# Patient Record
Sex: MALE | Race: WHITE | NOT HISPANIC OR LATINO | Employment: OTHER | URBAN - METROPOLITAN AREA
[De-identification: names, ages, dates, MRNs, and addresses within clinical notes are randomized per-mention and may not be internally consistent; named-entity substitution may affect disease eponyms.]

---

## 2023-05-02 ENCOUNTER — HOSPITAL ENCOUNTER (INPATIENT)
Facility: HOSPITAL | Age: 48
LOS: 2 days | Discharge: OTHER INSTITUTION | DRG: 291 | End: 2023-05-04
Attending: EMERGENCY MEDICINE | Admitting: STUDENT IN AN ORGANIZED HEALTH CARE EDUCATION/TRAINING PROGRAM
Payer: MEDICAID

## 2023-05-02 DIAGNOSIS — R10.84 GENERALIZED ABDOMINAL PAIN: ICD-10-CM

## 2023-05-02 DIAGNOSIS — I10 HYPERTENSION, UNSPECIFIED TYPE: Primary | ICD-10-CM

## 2023-05-02 DIAGNOSIS — R79.89 ELEVATED SERUM CREATININE: ICD-10-CM

## 2023-05-02 DIAGNOSIS — R06.02 SOB (SHORTNESS OF BREATH): ICD-10-CM

## 2023-05-02 DIAGNOSIS — I42.9 CARDIOMYOPATHY, UNSPECIFIED TYPE: ICD-10-CM

## 2023-05-02 DIAGNOSIS — R79.89 ELEVATED TROPONIN: ICD-10-CM

## 2023-05-02 DIAGNOSIS — I16.0 HYPERTENSIVE URGENCY: ICD-10-CM

## 2023-05-02 PROCEDURE — 82550 ASSAY OF CK (CPK): CPT | Performed by: EMERGENCY MEDICINE

## 2023-05-02 PROCEDURE — 93005 ELECTROCARDIOGRAM TRACING: CPT

## 2023-05-02 PROCEDURE — 96376 TX/PRO/DX INJ SAME DRUG ADON: CPT

## 2023-05-02 PROCEDURE — 84439 ASSAY OF FREE THYROXINE: CPT | Performed by: NURSE PRACTITIONER

## 2023-05-02 PROCEDURE — 83735 ASSAY OF MAGNESIUM: CPT | Performed by: NURSE PRACTITIONER

## 2023-05-02 PROCEDURE — 36415 COLL VENOUS BLD VENIPUNCTURE: CPT | Performed by: EMERGENCY MEDICINE

## 2023-05-02 PROCEDURE — 83690 ASSAY OF LIPASE: CPT | Performed by: EMERGENCY MEDICINE

## 2023-05-02 PROCEDURE — 12000002 HC ACUTE/MED SURGE SEMI-PRIVATE ROOM

## 2023-05-02 PROCEDURE — 85025 COMPLETE CBC W/AUTO DIFF WBC: CPT | Performed by: EMERGENCY MEDICINE

## 2023-05-02 PROCEDURE — 80053 COMPREHEN METABOLIC PANEL: CPT | Performed by: EMERGENCY MEDICINE

## 2023-05-02 PROCEDURE — 93010 EKG 12-LEAD: ICD-10-PCS | Mod: ,,, | Performed by: INTERNAL MEDICINE

## 2023-05-02 PROCEDURE — 93010 ELECTROCARDIOGRAM REPORT: CPT | Mod: ,,, | Performed by: INTERNAL MEDICINE

## 2023-05-02 PROCEDURE — 82077 ASSAY SPEC XCP UR&BREATH IA: CPT | Performed by: EMERGENCY MEDICINE

## 2023-05-02 PROCEDURE — 99285 EMERGENCY DEPT VISIT HI MDM: CPT | Mod: 25

## 2023-05-02 PROCEDURE — 84443 ASSAY THYROID STIM HORMONE: CPT | Performed by: NURSE PRACTITIONER

## 2023-05-02 PROCEDURE — 84484 ASSAY OF TROPONIN QUANT: CPT | Performed by: EMERGENCY MEDICINE

## 2023-05-02 PROCEDURE — 87389 HIV-1 AG W/HIV-1&-2 AB AG IA: CPT | Performed by: EMERGENCY MEDICINE

## 2023-05-02 PROCEDURE — 86803 HEPATITIS C AB TEST: CPT | Performed by: EMERGENCY MEDICINE

## 2023-05-02 PROCEDURE — 83880 ASSAY OF NATRIURETIC PEPTIDE: CPT | Performed by: NURSE PRACTITIONER

## 2023-05-02 PROCEDURE — 96374 THER/PROPH/DIAG INJ IV PUSH: CPT

## 2023-05-02 RX ORDER — MAG HYDROX/ALUMINUM HYD/SIMETH 200-200-20
30 SUSPENSION, ORAL (FINAL DOSE FORM) ORAL ONCE
Status: COMPLETED | OUTPATIENT
Start: 2023-05-03 | End: 2023-05-03

## 2023-05-02 RX ORDER — LIDOCAINE HYDROCHLORIDE 20 MG/ML
15 SOLUTION OROPHARYNGEAL ONCE
Status: COMPLETED | OUTPATIENT
Start: 2023-05-03 | End: 2023-05-03

## 2023-05-03 PROBLEM — E66.9 OBESITY (BMI 30.0-34.9): Status: ACTIVE | Noted: 2023-05-03

## 2023-05-03 PROBLEM — I10 PRIMARY HYPERTENSION: Status: ACTIVE | Noted: 2023-05-03

## 2023-05-03 PROBLEM — R10.9 ABDOMINAL PAIN: Status: ACTIVE | Noted: 2023-05-03

## 2023-05-03 LAB
ALBUMIN SERPL BCP-MCNC: 3.6 G/DL (ref 3.5–5.2)
ALP SERPL-CCNC: 63 U/L (ref 55–135)
ALT SERPL W/O P-5'-P-CCNC: 80 U/L (ref 10–44)
AMPHET+METHAMPHET UR QL: NEGATIVE
ANION GAP SERPL CALC-SCNC: 12 MMOL/L (ref 8–16)
AORTIC ROOT ANNULUS: 3.77 CM
AORTIC VALVE CUSP SEPERATION: 1.51 CM
ASCENDING AORTA: 2.84 CM
AST SERPL-CCNC: 43 U/L (ref 10–40)
AV INDEX (PROSTH): 0.94
AV MEAN GRADIENT: 5 MMHG
AV PEAK GRADIENT: 8 MMHG
AV VALVE AREA: 4.52 CM2
AV VELOCITY RATIO: 0.79
BACTERIA #/AREA URNS HPF: NORMAL /HPF
BARBITURATES UR QL SCN>200 NG/ML: NEGATIVE
BASOPHILS # BLD AUTO: 0.03 K/UL (ref 0–0.2)
BASOPHILS NFR BLD: 0.3 % (ref 0–1.9)
BENZODIAZ UR QL SCN>200 NG/ML: NEGATIVE
BILIRUB SERPL-MCNC: 0.8 MG/DL (ref 0.1–1)
BILIRUB UR QL STRIP: NEGATIVE
BNP SERPL-MCNC: 976 PG/ML (ref 0–99)
BSA FOR ECHO PROCEDURE: 2.36 M2
BUN SERPL-MCNC: 13 MG/DL (ref 6–20)
BZE UR QL SCN: NEGATIVE
CALCIUM SERPL-MCNC: 8.9 MG/DL (ref 8.7–10.5)
CANNABINOIDS UR QL SCN: NEGATIVE
CHLORIDE SERPL-SCNC: 100 MMOL/L (ref 95–110)
CK SERPL-CCNC: 142 U/L (ref 20–200)
CLARITY UR: CLEAR
CO2 SERPL-SCNC: 23 MMOL/L (ref 23–29)
COLOR UR: YELLOW
CREAT SERPL-MCNC: 1.5 MG/DL (ref 0.5–1.4)
CREAT UR-MCNC: 109.6 MG/DL (ref 23–375)
CV ECHO LV RWT: 0.55 CM
DIFFERENTIAL METHOD: ABNORMAL
DOP CALC AO PEAK VEL: 1.37 M/S
DOP CALC AO VTI: 19.5 CM
DOP CALC LVOT AREA: 4.8 CM2
DOP CALC LVOT DIAMETER: 2.47 CM
DOP CALC LVOT PEAK VEL: 1.08 M/S
DOP CALC LVOT STROKE VOLUME: 88.12 CM3
DOP CALC MV VTI: 16.2 CM
DOP CALCLVOT PEAK VEL VTI: 18.4 CM
E WAVE DECELERATION TIME: 204.05 MSEC
E/A RATIO: 0.89
ECHO LV POSTERIOR WALL: 1.42 CM (ref 0.6–1.1)
EJECTION FRACTION: 15 %
EOSINOPHIL # BLD AUTO: 0.1 K/UL (ref 0–0.5)
EOSINOPHIL NFR BLD: 0.6 % (ref 0–8)
ERYTHROCYTE [DISTWIDTH] IN BLOOD BY AUTOMATED COUNT: 15.6 % (ref 11.5–14.5)
EST. GFR  (NO RACE VARIABLE): 57 ML/MIN/1.73 M^2
ETHANOL SERPL-MCNC: <10 MG/DL
FRACTIONAL SHORTENING: 6 % (ref 28–44)
GLUCOSE SERPL-MCNC: 115 MG/DL (ref 70–110)
GLUCOSE UR QL STRIP: NEGATIVE
HCT VFR BLD AUTO: 49.8 % (ref 40–54)
HCV AB SERPL QL IA: NORMAL
HGB BLD-MCNC: 16.9 G/DL (ref 14–18)
HGB UR QL STRIP: ABNORMAL
HIV 1+2 AB+HIV1 P24 AG SERPL QL IA: NORMAL
HYALINE CASTS #/AREA URNS LPF: 0 /LPF
IMM GRANULOCYTES # BLD AUTO: 0.18 K/UL (ref 0–0.04)
IMM GRANULOCYTES NFR BLD AUTO: 1.8 % (ref 0–0.5)
INTERVENTRICULAR SEPTUM: 1.34 CM (ref 0.6–1.1)
IVRT: 87.54 MSEC
KETONES UR QL STRIP: NEGATIVE
LA MAJOR: 5.69 CM
LEFT INTERNAL DIMENSION IN SYSTOLE: 4.79 CM (ref 2.1–4)
LEFT VENTRICLE DIASTOLIC VOLUME INDEX: 59.43 ML/M2
LEFT VENTRICLE DIASTOLIC VOLUME: 135.5 ML
LEFT VENTRICLE MASS INDEX: 130 G/M2
LEFT VENTRICLE SYSTOLIC VOLUME INDEX: 47.9 ML/M2
LEFT VENTRICLE SYSTOLIC VOLUME: 109.13 ML
LEFT VENTRICULAR INTERNAL DIMENSION IN DIASTOLE: 5.12 CM (ref 3.5–6)
LEFT VENTRICULAR MASS: 296.03 G
LEUKOCYTE ESTERASE UR QL STRIP: NEGATIVE
LIPASE SERPL-CCNC: 24 U/L (ref 4–60)
LVOT MG: 2.66 MMHG
LVOT MV: 0.76 CM/S
LYMPHOCYTES # BLD AUTO: 1.5 K/UL (ref 1–4.8)
LYMPHOCYTES NFR BLD: 14.4 % (ref 18–48)
MAGNESIUM SERPL-MCNC: 1.9 MG/DL (ref 1.6–2.6)
MCH RBC QN AUTO: 30.5 PG (ref 27–31)
MCHC RBC AUTO-ENTMCNC: 33.9 G/DL (ref 32–36)
MCV RBC AUTO: 90 FL (ref 82–98)
METHADONE UR QL SCN>300 NG/ML: NEGATIVE
MICROSCOPIC COMMENT: NORMAL
MONOCYTES # BLD AUTO: 0.9 K/UL (ref 0.3–1)
MONOCYTES NFR BLD: 8.4 % (ref 4–15)
MV MEAN GRADIENT: 3 MMHG
MV PEAK A VEL: 1.05 M/S
MV PEAK E VEL: 0.93 M/S
MV PEAK GRADIENT: 8 MMHG
MV STENOSIS PRESSURE HALF TIME: 46.36 MS
MV VALVE AREA BY CONTINUITY EQUATION: 5.44 CM2
MV VALVE AREA P 1/2 METHOD: 4.75 CM2
NEUTROPHILS # BLD AUTO: 7.5 K/UL (ref 1.8–7.7)
NEUTROPHILS NFR BLD: 74.5 % (ref 38–73)
NITRITE UR QL STRIP: NEGATIVE
NRBC BLD-RTO: 0 /100 WBC
OHS LV EJECTION FRACTION SIMPSONS BIPLANE MOD: 2 %
OPIATES UR QL SCN: NEGATIVE
PCP UR QL SCN>25 NG/ML: NEGATIVE
PH UR STRIP: 6 [PH] (ref 5–8)
PISA MRMAX VEL: 4.6 M/S
PISA TR MAX VEL: 3.44 M/S
PLATELET # BLD AUTO: 189 K/UL (ref 150–450)
PMV BLD AUTO: 9.5 FL (ref 9.2–12.9)
POTASSIUM SERPL-SCNC: 4.7 MMOL/L (ref 3.5–5.1)
PROT SERPL-MCNC: 6.6 G/DL (ref 6–8.4)
PROT UR QL STRIP: ABNORMAL
PV MV: 0.58 M/S
PV PEAK VELOCITY: 0.81 CM/S
RA MAJOR: 4.97 CM
RA PRESSURE: 3 MMHG
RBC # BLD AUTO: 5.54 M/UL (ref 4.6–6.2)
RBC #/AREA URNS HPF: 2 /HPF (ref 0–4)
RIGHT VENTRICULAR END-DIASTOLIC DIMENSION: 3.87 CM
RIGHT VENTRICULAR LENGTH IN DIASTOLE (APICAL 4-CHAMBER VIEW): 67.3 CM
SODIUM SERPL-SCNC: 135 MMOL/L (ref 136–145)
SP GR UR STRIP: 1.01 (ref 1–1.03)
STJ: 2.62 CM
T4 FREE SERPL-MCNC: 0.98 NG/DL (ref 0.71–1.51)
TOXICOLOGY INFORMATION: NORMAL
TR MAX PG: 47 MMHG
TROPONIN I SERPL DL<=0.01 NG/ML-MCNC: 0.06 NG/ML (ref 0–0.03)
TROPONIN I SERPL DL<=0.01 NG/ML-MCNC: 0.1 NG/ML (ref 0–0.03)
TROPONIN I SERPL DL<=0.01 NG/ML-MCNC: 0.22 NG/ML (ref 0–0.03)
TROPONIN I SERPL DL<=0.01 NG/ML-MCNC: 0.42 NG/ML (ref 0–0.03)
TSH SERPL DL<=0.005 MIU/L-ACNC: 0.07 UIU/ML (ref 0.4–4)
TV REST PULMONARY ARTERY PRESSURE: 50 MMHG
URN SPEC COLLECT METH UR: ABNORMAL
UROBILINOGEN UR STRIP-ACNC: NEGATIVE EU/DL
WBC # BLD AUTO: 10.07 K/UL (ref 3.9–12.7)
WBC #/AREA URNS HPF: 0 /HPF (ref 0–5)

## 2023-05-03 PROCEDURE — G0378 HOSPITAL OBSERVATION PER HR: HCPCS

## 2023-05-03 PROCEDURE — 84484 ASSAY OF TROPONIN QUANT: CPT | Mod: 91 | Performed by: STUDENT IN AN ORGANIZED HEALTH CARE EDUCATION/TRAINING PROGRAM

## 2023-05-03 PROCEDURE — 99223 1ST HOSP IP/OBS HIGH 75: CPT | Mod: ,,, | Performed by: INTERNAL MEDICINE

## 2023-05-03 PROCEDURE — 36415 COLL VENOUS BLD VENIPUNCTURE: CPT | Performed by: EMERGENCY MEDICINE

## 2023-05-03 PROCEDURE — 63600175 PHARM REV CODE 636 W HCPCS: Performed by: EMERGENCY MEDICINE

## 2023-05-03 PROCEDURE — 25000003 PHARM REV CODE 250: Performed by: EMERGENCY MEDICINE

## 2023-05-03 PROCEDURE — 63600175 PHARM REV CODE 636 W HCPCS: Performed by: NURSE PRACTITIONER

## 2023-05-03 PROCEDURE — 36415 COLL VENOUS BLD VENIPUNCTURE: CPT | Performed by: NURSE PRACTITIONER

## 2023-05-03 PROCEDURE — 36415 COLL VENOUS BLD VENIPUNCTURE: CPT | Performed by: STUDENT IN AN ORGANIZED HEALTH CARE EDUCATION/TRAINING PROGRAM

## 2023-05-03 PROCEDURE — 25000003 PHARM REV CODE 250: Performed by: STUDENT IN AN ORGANIZED HEALTH CARE EDUCATION/TRAINING PROGRAM

## 2023-05-03 PROCEDURE — 80307 DRUG TEST PRSMV CHEM ANLYZR: CPT | Performed by: EMERGENCY MEDICINE

## 2023-05-03 PROCEDURE — 96372 THER/PROPH/DIAG INJ SC/IM: CPT | Performed by: STUDENT IN AN ORGANIZED HEALTH CARE EDUCATION/TRAINING PROGRAM

## 2023-05-03 PROCEDURE — 99900035 HC TECH TIME PER 15 MIN (STAT)

## 2023-05-03 PROCEDURE — 81000 URINALYSIS NONAUTO W/SCOPE: CPT | Mod: 59 | Performed by: EMERGENCY MEDICINE

## 2023-05-03 PROCEDURE — 63600175 PHARM REV CODE 636 W HCPCS: Performed by: STUDENT IN AN ORGANIZED HEALTH CARE EDUCATION/TRAINING PROGRAM

## 2023-05-03 PROCEDURE — 96375 TX/PRO/DX INJ NEW DRUG ADDON: CPT

## 2023-05-03 PROCEDURE — 94761 N-INVAS EAR/PLS OXIMETRY MLT: CPT

## 2023-05-03 PROCEDURE — 12000002 HC ACUTE/MED SURGE SEMI-PRIVATE ROOM

## 2023-05-03 PROCEDURE — 99223 PR INITIAL HOSPITAL CARE,LEVL III: ICD-10-PCS | Mod: ,,, | Performed by: INTERNAL MEDICINE

## 2023-05-03 RX ORDER — FAMOTIDINE 20 MG/1
20 TABLET, FILM COATED ORAL 2 TIMES DAILY PRN
Status: DISCONTINUED | OUTPATIENT
Start: 2023-05-03 | End: 2023-05-04 | Stop reason: HOSPADM

## 2023-05-03 RX ORDER — LANOLIN ALCOHOL/MO/W.PET/CERES
800 CREAM (GRAM) TOPICAL
Status: DISCONTINUED | OUTPATIENT
Start: 2023-05-03 | End: 2023-05-03

## 2023-05-03 RX ORDER — FUROSEMIDE 20 MG/1
20 TABLET ORAL 2 TIMES DAILY
Status: DISCONTINUED | OUTPATIENT
Start: 2023-05-03 | End: 2023-05-04

## 2023-05-03 RX ORDER — SODIUM CHLORIDE 0.9 % (FLUSH) 0.9 %
10 SYRINGE (ML) INJECTION EVERY 8 HOURS PRN
Status: DISCONTINUED | OUTPATIENT
Start: 2023-05-03 | End: 2023-05-04 | Stop reason: HOSPADM

## 2023-05-03 RX ORDER — FUROSEMIDE 10 MG/ML
20 INJECTION INTRAMUSCULAR; INTRAVENOUS
Status: DISCONTINUED | OUTPATIENT
Start: 2023-05-03 | End: 2023-05-03

## 2023-05-03 RX ORDER — FUROSEMIDE 10 MG/ML
40 INJECTION INTRAMUSCULAR; INTRAVENOUS ONCE
Status: COMPLETED | OUTPATIENT
Start: 2023-05-03 | End: 2023-05-03

## 2023-05-03 RX ORDER — ASPIRIN 325 MG
325 TABLET ORAL DAILY
Status: ON HOLD | COMMUNITY
End: 2023-05-06 | Stop reason: HOSPADM

## 2023-05-03 RX ORDER — HYDRALAZINE HYDROCHLORIDE 20 MG/ML
10 INJECTION INTRAMUSCULAR; INTRAVENOUS
Status: COMPLETED | OUTPATIENT
Start: 2023-05-03 | End: 2023-05-03

## 2023-05-03 RX ORDER — ACETAMINOPHEN 325 MG/1
650 TABLET ORAL EVERY 4 HOURS PRN
Status: DISCONTINUED | OUTPATIENT
Start: 2023-05-03 | End: 2023-05-04 | Stop reason: HOSPADM

## 2023-05-03 RX ORDER — SODIUM,POTASSIUM PHOSPHATES 280-250MG
2 POWDER IN PACKET (EA) ORAL
Status: DISCONTINUED | OUTPATIENT
Start: 2023-05-03 | End: 2023-05-03

## 2023-05-03 RX ORDER — IPRATROPIUM BROMIDE AND ALBUTEROL SULFATE 2.5; .5 MG/3ML; MG/3ML
3 SOLUTION RESPIRATORY (INHALATION) EVERY 4 HOURS PRN
Status: DISCONTINUED | OUTPATIENT
Start: 2023-05-03 | End: 2023-05-03

## 2023-05-03 RX ORDER — TALC
9 POWDER (GRAM) TOPICAL NIGHTLY PRN
Status: DISCONTINUED | OUTPATIENT
Start: 2023-05-03 | End: 2023-05-03

## 2023-05-03 RX ORDER — ISOSORBIDE MONONITRATE 60 MG/1
60 TABLET, EXTENDED RELEASE ORAL DAILY
Status: DISCONTINUED | OUTPATIENT
Start: 2023-05-03 | End: 2023-05-04 | Stop reason: HOSPADM

## 2023-05-03 RX ORDER — IBUPROFEN 200 MG
16 TABLET ORAL
Status: DISCONTINUED | OUTPATIENT
Start: 2023-05-03 | End: 2023-05-03

## 2023-05-03 RX ORDER — ENOXAPARIN SODIUM 100 MG/ML
40 INJECTION SUBCUTANEOUS EVERY 24 HOURS
Status: DISCONTINUED | OUTPATIENT
Start: 2023-05-03 | End: 2023-05-04 | Stop reason: HOSPADM

## 2023-05-03 RX ORDER — HYDRALAZINE HYDROCHLORIDE 20 MG/ML
10 INJECTION INTRAMUSCULAR; INTRAVENOUS EVERY 6 HOURS PRN
Status: DISCONTINUED | OUTPATIENT
Start: 2023-05-03 | End: 2023-05-04 | Stop reason: HOSPADM

## 2023-05-03 RX ORDER — AMOXICILLIN 250 MG
1 CAPSULE ORAL 2 TIMES DAILY
Status: DISCONTINUED | OUTPATIENT
Start: 2023-05-03 | End: 2023-05-03

## 2023-05-03 RX ORDER — AMLODIPINE BESYLATE 5 MG/1
10 TABLET ORAL DAILY
Status: DISCONTINUED | OUTPATIENT
Start: 2023-05-03 | End: 2023-05-04 | Stop reason: HOSPADM

## 2023-05-03 RX ORDER — ACETAMINOPHEN 325 MG/1
650 TABLET ORAL EVERY 6 HOURS PRN
Status: DISCONTINUED | OUTPATIENT
Start: 2023-05-03 | End: 2023-05-03

## 2023-05-03 RX ORDER — HYDRALAZINE HYDROCHLORIDE 20 MG/ML
20 INJECTION INTRAMUSCULAR; INTRAVENOUS ONCE
Status: COMPLETED | OUTPATIENT
Start: 2023-05-03 | End: 2023-05-03

## 2023-05-03 RX ORDER — GLUCAGON 1 MG
1 KIT INJECTION
Status: DISCONTINUED | OUTPATIENT
Start: 2023-05-03 | End: 2023-05-03

## 2023-05-03 RX ORDER — SIMETHICONE 80 MG
1 TABLET,CHEWABLE ORAL 4 TIMES DAILY PRN
Status: DISCONTINUED | OUTPATIENT
Start: 2023-05-03 | End: 2023-05-03

## 2023-05-03 RX ORDER — CARVEDILOL 6.25 MG/1
12.5 TABLET ORAL 2 TIMES DAILY
Status: DISCONTINUED | OUTPATIENT
Start: 2023-05-03 | End: 2023-05-04 | Stop reason: HOSPADM

## 2023-05-03 RX ORDER — IBUPROFEN 200 MG
24 TABLET ORAL
Status: DISCONTINUED | OUTPATIENT
Start: 2023-05-03 | End: 2023-05-03

## 2023-05-03 RX ORDER — NALOXONE HCL 0.4 MG/ML
0.02 VIAL (ML) INJECTION
Status: DISCONTINUED | OUTPATIENT
Start: 2023-05-03 | End: 2023-05-03

## 2023-05-03 RX ORDER — MAG HYDROX/ALUMINUM HYD/SIMETH 200-200-20
30 SUSPENSION, ORAL (FINAL DOSE FORM) ORAL 4 TIMES DAILY PRN
Status: DISCONTINUED | OUTPATIENT
Start: 2023-05-03 | End: 2023-05-03

## 2023-05-03 RX ADMIN — FAMOTIDINE 20 MG: 20 TABLET, FILM COATED ORAL at 12:05

## 2023-05-03 RX ADMIN — ISOSORBIDE MONONITRATE 60 MG: 60 TABLET, EXTENDED RELEASE ORAL at 03:05

## 2023-05-03 RX ADMIN — AMLODIPINE BESYLATE 10 MG: 5 TABLET ORAL at 03:05

## 2023-05-03 RX ADMIN — HYDRALAZINE HYDROCHLORIDE 10 MG: 20 INJECTION INTRAMUSCULAR; INTRAVENOUS at 02:05

## 2023-05-03 RX ADMIN — FUROSEMIDE 20 MG: 20 TABLET ORAL at 05:05

## 2023-05-03 RX ADMIN — CARVEDILOL 12.5 MG: 6.25 TABLET, FILM COATED ORAL at 10:05

## 2023-05-03 RX ADMIN — SODIUM CHLORIDE 1000 ML: 0.9 INJECTION, SOLUTION INTRAVENOUS at 02:05

## 2023-05-03 RX ADMIN — ALUMINUM HYDROXIDE, MAGNESIUM HYDROXIDE, AND SIMETHICONE 30 ML: 200; 200; 20 SUSPENSION ORAL at 12:05

## 2023-05-03 RX ADMIN — CARVEDILOL 12.5 MG: 6.25 TABLET, FILM COATED ORAL at 03:05

## 2023-05-03 RX ADMIN — ENOXAPARIN SODIUM 40 MG: 40 INJECTION SUBCUTANEOUS at 05:05

## 2023-05-03 RX ADMIN — HYDRALAZINE HYDROCHLORIDE 20 MG: 20 INJECTION INTRAMUSCULAR; INTRAVENOUS at 04:05

## 2023-05-03 RX ADMIN — FUROSEMIDE 40 MG: 10 INJECTION, SOLUTION INTRAMUSCULAR; INTRAVENOUS at 06:05

## 2023-05-03 RX ADMIN — LIDOCAINE HYDROCHLORIDE 15 ML: 20 SOLUTION ORAL; TOPICAL at 12:05

## 2023-05-03 NOTE — PLAN OF CARE
[facility]  Initial Discharge Assessment       Primary Care Provider: Primary Doctor No    Admission Diagnosis: SOB (shortness of breath) [R06.02]  Generalized abdominal pain [R10.84]  Hypertensive urgency [I16.0]    Admission Date: 5/2/2023  Expected Discharge Date:     Discharge Barriers Identified: None    Payor: /     Extended Emergency Contact Information  Primary Emergency Contact: Shasha Kovacs  Mobile Phone: [phone]  Relation: Friend    Discharge Plan A: Home  Discharge Plan B: Home with family      [id number] - [place], [place] - [address]  [address]  [facility] [address]  Phone: [phone] Fax: [phone]    DC assessment completed, information on facesheet verified. Lives at listed address with mother. Friend will drive him home. Denies pcp. Pharmacy is [facility] on [address]. Denies blood thinners, hh, hd, dme. Does not have insurance. NOK is mother. Denies recent admission. Plan to dc home when clear.       Initial Assessment (most recent)       Adult Discharge Assessment - 05/03/23 1000          Discharge Assessment    Assessment Type Discharge Planning Assessment     Confirmed/corrected address, phone number and insurance Yes     Confirmed Demographics Correct on Facesheet     Source of Information patient     Does patient/caregiver understand observation status Yes     Communicated ANUJA with patient/caregiver Yes     People in Home parent(s)     Facility Arrived From: home     Do you expect to return to your current living situation? Yes     Do you have help at home or someone to help you manage your care at home? No     Prior to hospitilization cognitive status: Alert/Oriented     Current cognitive status: Alert/Oriented     Equipment Currently Used at Home none     Readmission within 30 days? No     Patient currently being followed by outpatient case management? No     Do you currently have service(s) that help  you manage your care at home? No     Do you take prescription medications? No     Do you have prescription coverage? No     Do you have any problems affording any of your prescribed medications? TBD     Is the patient taking medications as prescribed? --   no rx    How do you get to doctors appointments? car, drives self     Are you on dialysis? No     Do you take coumadin? No     Discharge Plan A Home     Discharge Plan B Home with family     DME Needed Upon Discharge  none     Discharge Plan discussed with: Patient     Discharge Barriers Identified None

## 2023-05-03 NOTE — ED NOTES
Report given to ROBERTO Williamson.  Was told to take one more blood pressure before bringing him up.

## 2023-05-03 NOTE — ASSESSMENT & PLAN NOTE
Chronic  Body mass index is 34.7 kg/m². Morbid obesity complicates all aspects of disease management from diagnostic modalities to treatment. Weight loss encouraged and health benefits explained to patient.

## 2023-05-03 NOTE — CONSULTS
[facility]  Department of Cardiology  Consult Note      PATIENT NAME: Jordi Moreno    MRN: [id number]  TODAY'S DATE: 05/03/2023  ADMIT DATE: 5/2/2023                          CONSULT REQUESTED BY: Dale Bolden MD    SUBJECTIVE     PRINCIPAL PROBLEM: Abdominal pain      REASON FOR CONSULT:  Heart failure      Admission HPI:  Jordi Moreno is a 39-year-old male who presents emergency room complaining of abdominal pain and distention.  Symptoms onset 3 days ago.  He reports symptoms progressively worsened over the past 8 hours.  Denies any fever or chills.  No known sick contacts or travel.  No aggravating or alleviating factors.  He does endorse that he placed himself on ampicillin for an upper respiratory infection recently? ?  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 ?  He denies any pertinent previous medical history.  He denies any pertinent surgical history.  He was a nonsmoker.  He denies using any alcohol.  ER workup:  CBC unremarkable.  CMP with creatinine of 1.5, AST 43 and ALT of 80.  Troponin mildly elevated 0.096.  Urinalysis with trace occult blood.  CT the abdomen and pelvis demonstrates some gallbladder thickening without identified stone, recommending gallbladder ultrasound.  Patient was found to be hypertensive on arrival emergency room.  He was given hydralazine with some improvement.  Patient will be admitted to Hospital Medicine for treatment management.    Cardiology consult:  Seen and examined patient at bedside.  Patient feels much better.  Denies any history of heart disease in the past.  States he is physically very fit and did extensive hiking last year.  Presented with abdominal pain and recent onset dyspnea.  Denies any chest pain.  States he had  bronchitis about a month ago    Review of patient's allergies indicates:  No Known Allergies    Past Medical History:   Diagnosis Date    Asthma      History reviewed. No pertinent surgical  history.  Social History     Tobacco Use    Smoking status: Never    Smokeless tobacco: Never   Substance Use Topics    Alcohol use: Yes     Alcohol/week: 5.0 standard drinks     Types: 5 Drinks containing 0.5 oz of alcohol per week    Drug use: Yes     Types: Cocaine     Comment: Pt states occational cocaine use        REVIEW OF SYSTEMS  All other systems negative except as mentioned in HPI.     OBJECTIVE     VITAL SIGNS (Most Recent)  Temp: 98.2 °F (36.8 °C) (05/03/23 1103)  Pulse: (!) 119 (05/03/23 1103)  Resp: 18 (05/03/23 1103)  BP: 130/86 (05/03/23 1103)  SpO2: 95 % (05/03/23 1103)    VENTILATION STATUS  Resp: 18 (05/03/23 1103)  SpO2: 95 % (05/03/23 1103)           I & O (Last 24H):No intake or output data in the 24 hours ending 05/03/23 1226    WEIGHTS  Wt Readings from Last 1 Encounters:   05/03/23 0735 114.8 kg (253 lb)   05/03/23 0515 114.8 kg (253 lb 1.4 oz)   05/02/23 2323 106.6 kg (235 lb)       PHYSICAL EXAM  GENERAL:  NAD  HEENT: Normocephalic. No pallor/icterus.   NECK: No JVD  CARDIAC: Regular rate and rhythm. S1 is normal.S2 is normal.  No murmurs.   LUNGS:  CTA b/l  ABDOMEN: Soft. Normal bowel sounds.    EXTREMITIES:  No edema/cyanosis.    CNS:  AAO x3, no focal deficits.   SKIN:  No rash.    PSYCH: normal affect    HOME MEDICATIONS:  No current facility-administered medications on file prior to encounter.     Current Outpatient Medications on File Prior to Encounter   Medication Sig Dispense Refill    aspirin 325 MG tablet Take 325 mg by mouth once daily.         SCHEDULED MEDS:   amLODIPine  10 mg Oral Daily    carvediloL  12.5 mg Oral BID    enoxaparin  40 mg Subcutaneous Daily    furosemide (LASIX) injection  20 mg Intravenous Q12H    isosorbide mononitrate  60 mg Oral Daily       CONTINUOUS INFUSIONS:    PRN MEDS:acetaminophen, famotidine, sodium chloride 0.9%    LABS AND DIAGNOSTICS     CBC LAST 3 DAYS  Recent Labs   Lab 05/02/23  2343   WBC 10.07   RBC 5.54   HGB 16.9   HCT 49.8   MCV 90    MCH 30.5   MCHC 33.9   RDW 15.6*      MPV 9.5   GRAN 74.5*  7.5   LYMPH 14.4*  1.5   MONO 8.4  0.9   BASO 0.03   NRBC 0       COAGULATION LAST 3 DAYS  No results for input(s): LABPT, INR, APTT in the last 168 hours.    CHEMISTRY LAST 3 DAYS  Recent Labs   Lab 05/02/23  2343   *   K 4.7      CO2 23   ANIONGAP 12   BUN 13   CREATININE 1.5*   *   CALCIUM 8.9   MG 1.9   ALBUMIN 3.6   PROT 6.6   ALKPHOS 63   ALT 80*   AST 43*   BILITOT 0.8       CARDIAC PROFILE LAST 3 DAYS  Recent Labs   Lab 05/02/23  2343 05/03/23  0818   *  --      --    TROPONINI 0.096* 0.059*       ENDOCRINE LAST 3 DAYS  Recent Labs   Lab 05/02/23  2343   TSH 0.065*       LAST ARTERIAL BLOOD GAS  ABG  No results for input(s): PH, PO2, PCO2, HCO3, BE in the last 168 hours.    LAST 7 DAYS MICROBIOLOGY   Microbiology Results (last 7 days)       ** No results found for the last 168 hours. **            MOST RECENT IMAGING  Echo  · The left ventricle is normal in size with mild concentric hypertrophy   and  · Mild tricuspid regurgitation.  · Normal right ventricular size with normal right ventricular systolic   function.  · Normal central venous pressure (3 mmHg).  · The estimated PA systolic pressure is 50 mmHg.  · There is pulmonary hypertension.  · The estimated ejection fraction is 15%.  · Grade I left ventricular diastolic dysfunction.  · There is severe left ventricular global hypokinesis.     US Abdomen Limited  Narrative: EXAMINATION:  US ABDOMEN LIMITED    CLINICAL HISTORY:  abd distention;    TECHNIQUE:  Limited ultrasound of the right upper quadrant of the abdomen (including pancreas, liver, gallbladder, common bile duct, and spleen) was performed.    COMPARISON:  None.    FINDINGS:  Liver: Normal in size, measuring 15.4 cm. Homogeneous echotexture. No focal hepatic lesions.    Gallbladder: There is diffuse gallbladder wall thickening up to 6 mm diameter.  There are several lobular intraluminal  projections measuring between 3-5 mm without associated vascular flow or shadowing.  No discrete cystic spaces or comet tail artifact.  No Woodson sign.  No pericholecystic fluid.    Biliary system: The common duct is mildly dilated, measuring 7 mm.  No intrahepatic ductal dilatation.    Spleen: Normal in size and echotexture, measuring 10.9 cm.    Miscellaneous: Bilateral pleural fluid.  Impression: 1. Gallbladder cholesterolosis with multi polyps measuring up to 5 mm.  Recommend general surgery consultation in an elective setting.  2. Bilateral pleural effusions.  3. Mild common duct dilation. Correlate with bilirubin level as the need for further assessment with ERCP/MRCP.    Electronically signed by: Ab Martin  Date:    05/03/2023  Time:    08:57  X-Ray Chest AP Portable  Narrative: EXAMINATION:  XR CHEST AP PORTABLE    CLINICAL HISTORY:  Shortness of breath    TECHNIQUE:  Portable view of the chest was performed.    COMPARISON:  None.    FINDINGS:  Mild diffuse prominence of the pulmonary interstitium most predominant within the perihilar lungs and bilateral lower lobes consistent with pulmonary edema.  Heart size is enlarged.  Trachea midline.    Bony thorax intact.  Impression: Congestive heart failure.  Close interval follow-up is recommended.    This report was flagged in Epic as abnormal.  The preliminary and final reports are concordant.    Electronically signed by: Mello Dan  Date:    05/03/2023  Time:    08:07  CT Abdomen Pelvis  Without Contrast  Narrative: EXAMINATION:  CT ABDOMEN PELVIS WITHOUT CONTRAST    CLINICAL HISTORY:  Abdominal pain, acute, nonlocalized;    TECHNIQUE:  Multiplanar images were obtained of the abdomen and pelvis from the hemidiaphragms through the symphysis pubis without intravenous contrast.    COMPARISON:  None    FINDINGS:  Lung Bases: There is interlobular septal thickening compatible with pulmonary edema.  There are trace bilateral pleural effusions.    Heart: The  heart is borderline enlarged.  No pericardial effusion.    Liver: Normal in size and attenuation without focal hepatic lesion.    Biliary tract: No intrahepatic or extrahepatic biliary ductal dilatation.    Gallbladder: There is nonspecific gallbladder wall thickening which may be reactive.  No radiodense gallstone seen.  The gallbladder is not over distended.    Pancreas: Normal. No pancreatic ductal dilatation.    Spleen: Normal size without focal lesion.    Adrenals: Normal.    Kidneys and urinary collecting systems: Normal.  No hydronephrosis or urolithiasis.    Lymph nodes: None enlarged.    Stomach and bowel: The stomach is normal.  Loops of small and large bowel are normal in caliber without evidence for inflammation or obstruction.  The appendix is normal.    Peritoneum and mesentery: There is trace perihepatic ascites.    Vasculature: Normal.    Urinary bladder: Normal.    Reproductive organs: Prostate is normal.    Body wall: No abnormality.    Musculoskeletal: No aggressive osseous lesion.  Impression: 1. Gallbladder wall thickening without radiodense gallstone.  Findings may be reactive.  Recommend further evaluation with right upper quadrant ultrasound.  2. Trace pleural effusions and suspected interstitial pulmonary edema in the lung bases.  3. Trace perihepatic ascites.    Electronically signed by: Parish Estrada  Date:    05/03/2023  Time:    00:03      ECHOCARDIOGRAM RESULTS (last 5)  Results for orders placed during the hospital encounter of 05/02/23    Echo    Interpretation Summary  · The left ventricle is normal in size with mild concentric hypertrophy and  · Mild tricuspid regurgitation.  · Normal right ventricular size with normal right ventricular systolic function.  · Normal central venous pressure (3 mmHg).  · The estimated PA systolic pressure is 50 mmHg.  · There is pulmonary hypertension.  · The estimated ejection fraction is 15%.  · Grade I left ventricular diastolic dysfunction.  ·  There is severe left ventricular global hypokinesis.      CURRENT/PREVIOUS VISIT EKG  Results for orders placed or performed during the hospital encounter of 05/02/23   EKG 12-lead    Collection Time: 05/02/23 11:57 PM    Narrative    Test Reason : R10.84,    Vent. Rate : 117 BPM     Atrial Rate : 117 BPM     P-R Int : 148 ms          QRS Dur : 092 ms      QT Int : 334 ms       P-R-T Axes : 054 052 034 degrees     QTc Int : 465 ms    Sinus tachycardia  Nonspecific T wave abnormality  Abnormal ECG  No previous ECGs available    Referred By: [email or web address]   SELF           Confirmed By:        ECG reviewed by me:  Sinus tachycardia, nonspecific ST-T changes   ASSESSMENT/PLAN:     Active Hospital Problems    Diagnosis    *Abdominal pain    Primary hypertension    Obesity (BMI 30.0-34.9)       ASSESSMENT & PLAN:   New onset HFrEF, severe cardiomyopathy- unclear etiology ischemic vs tachycardia mediated vs viral vs chronic uncontrolled hypertension.       -echo reviewed.  Severely depressed LV function.   -volume status improved.  Switch to Lasix p.o. 20 mg  -continue Coreg  -creatinine elevated.  Unknown baseline.  We will start Entresto after repeating creatinine tomorrow  -workup for hyperthyroidism.  (Low TSH and sinus tachycardia)  -will plan for cardiac catheterization after stabilization of renal function      Guzman Holbrook MD  [facility]  Department of Cardiology  Date of Service: 05/03/2023  12:26 PM

## 2023-05-03 NOTE — ED NOTES
Updated 2nd floor on pt. Blood pressure.  Telebox applied to pt.  Monitor room notified.  [address][id number]

## 2023-05-03 NOTE — H&P
[facility]  History & Physical    Patient Name: Jordi Moreno  MRN: [id number]  Patient Class: OP- Observation  Admission Date: 5/2/2023  Attending Physician: Dael Bolden MD   Primary Care Provider: Primary Doctor No         Patient information was obtained from patient, past medical records and ER records.     Subjective:     Principal Problem:Abdominal pain    Chief Complaint:   Chief Complaint   Patient presents with    Abdominal Pain     Feels distended started 3 days ago        HPI: Jordi Moreno is a 39-year-old male who presents emergency room complaining of abdominal pain and distention.  Symptoms onset 3 days ago.  He reports symptoms progressively worsened over the past 8 hours.  Denies any fever or chills.  No known sick contacts or travel.  No aggravating or alleviating factors.  He does endorse that he placed himself on ampicillin for an upper respiratory infection recently? ?  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 ?  He denies any pertinent previous medical history.  He denies any pertinent surgical history.  He was a nonsmoker.  He denies using any alcohol.  ER workup:  CBC unremarkable.  CMP with creatinine of 1.5, AST 43 and ALT of 80.  Troponin mildly elevated 0.096.  Urinalysis with trace occult blood.  CT the abdomen and pelvis demonstrates some gallbladder thickening without identified stone, recommending gallbladder ultrasound.  Patient was found to be hypertensive on arrival emergency room.  He was given hydralazine with some improvement.  Patient will be admitted to Hospital Medicine for treatment management.      No past medical history on file.    No past surgical history on file.    Review of patient's allergies indicates:  No Known Allergies    No current facility-administered medications on file prior to encounter.     No current outpatient medications on file prior to encounter.     Family History    None        Tobacco Use    Smoking status: Not on file    Smokeless tobacco: Not on file   Substance and Sexual Activity    Alcohol use: Not on file    Drug use: Not on file    Sexual activity: Not on file     Review of Systems   Constitutional:  Positive for activity change and appetite change. Negative for chills, diaphoresis and fever.   HENT:  Negative for congestion, nosebleeds and tinnitus.    Eyes:  Negative for photophobia and visual disturbance.   Respiratory:  Negative for cough, chest tightness, shortness of breath and wheezing.    Cardiovascular:  Negative for chest pain, palpitations and leg swelling.   Gastrointestinal:  Positive for abdominal distention and abdominal pain. Negative for constipation, diarrhea, nausea and vomiting.   Endocrine: Negative for cold intolerance and heat intolerance.   Genitourinary:  Negative for difficulty urinating, dysuria, frequency, hematuria and urgency.   Musculoskeletal:  Negative for arthralgias, back pain and myalgias.   Skin:  Negative for pallor, rash and wound.   Allergic/Immunologic: Negative for immunocompromised state.   Neurological:  Negative for dizziness, tremors, facial asymmetry, speech difficulty and weakness.   Hematological:  Negative for adenopathy. Does not bruise/bleed easily.   Psychiatric/Behavioral:  Negative for confusion and sleep disturbance. The patient is not nervous/anxious.    Objective:     Vital Signs (Most Recent):  Temp: 97.7 °F (36.5 °C) (05/02/23 2323)  Pulse: 107 (05/03/23 0248)  Resp: 20 (05/02/23 2323)  BP: (!) 167/112 (05/03/23 0247)  SpO2: 95 % (05/03/23 0248)   Vital Signs (24h Range):  Temp:  [97.7 °F (36.5 °C)] 97.7 °F (36.5 °C)  Pulse:  [107-122] 107  Resp:  [20] 20  SpO2:  [94 %-100 %] 95 %  BP: (167-221)/(112-139) 167/112     Weight: 106.6 kg (235 lb)  Body mass index is 34.7 kg/m².    Physical Exam  Vitals and nursing note reviewed.   Constitutional:       General: He is not in acute distress.     Appearance: He is  well-developed. He is not ill-appearing or diaphoretic.   HENT:      Head: Normocephalic.      Mouth/Throat:      Mouth: Mucous membranes are moist.      Pharynx: Oropharynx is clear.   Eyes:      General: No scleral icterus.     Conjunctiva/sclera: Conjunctivae normal.      Pupils: Pupils are equal, round, and reactive to light.   Neck:      Vascular: No JVD.   Cardiovascular:      Rate and Rhythm: Normal rate and regular rhythm.      Heart sounds: Normal heart sounds. No murmur heard.    No friction rub. No gallop.   Pulmonary:      Effort: Pulmonary effort is normal. No respiratory distress.      Breath sounds: Normal breath sounds. No wheezing or rales.   Abdominal:      General: Bowel sounds are normal. There is no distension.      Palpations: Abdomen is soft.      Tenderness: There is no abdominal tenderness. There is no guarding or rebound.   Musculoskeletal:         General: No tenderness. Normal range of motion.      Cervical back: Normal range of motion and neck supple.   Lymphadenopathy:      Cervical: No cervical adenopathy.   Skin:     General: Skin is warm and dry.      Capillary Refill: Capillary refill takes less than 2 seconds.      Coloration: Skin is not pale.      Findings: No erythema or rash.   Neurological:      Mental Status: He is alert and oriented to person, place, and time.      Cranial Nerves: No cranial nerve deficit.      Sensory: No sensory deficit.      Coordination: Coordination normal.      Deep Tendon Reflexes: Reflexes normal.   Psychiatric:         Behavior: Behavior normal.         Thought Content: Thought content normal.         Judgment: Judgment normal.         CRANIAL NERVES     CN III, IV, VI   Pupils are equal, round, and reactive to light.     Significant Labs: All pertinent labs within the past 24 hours have been reviewed.  CBC:   Recent Labs   Lab 05/02/23  2343   WBC 10.07   HGB 16.9   HCT 49.8        CMP:   Recent Labs   Lab 05/02/23  2343   *   K 4.7       CO2 23   *   BUN 13   CREATININE 1.5*   CALCIUM 8.9   PROT 6.6   ALBUMIN 3.6   BILITOT 0.8   ALKPHOS 63   AST 43*   ALT 80*   ANIONGAP 12     Lipase:   Recent Labs   Lab 05/02/23  2343   LIPASE 24     Urine Studies:   Recent Labs   Lab 05/03/23  0019   COLORU Yellow   APPEARANCEUA Clear   PHUR 6.0   SPECGRAV 1.015   PROTEINUA 3+*   GLUCUA Negative   KETONESU Negative   BILIRUBINUA Negative   OCCULTUA Trace*   NITRITE Negative   UROBILINOGEN Negative   LEUKOCYTESUR Negative   RBCUA 2   WBCUA 0   BACTERIA None   HYALINECASTS 0       Significant Imaging: I have reviewed all pertinent imaging results/findings within the past 24 hours.    CT abdomen pelvis:   Impression:     1. Gallbladder wall thickening without radiodense gallstone.  Findings may be reactive.  Recommend further evaluation with right upper quadrant ultrasound.  2. Trace pleural effusions and suspected interstitial pulmonary edema in the lung bases.  3. Trace perihepatic ascites.        Assessment/Plan:     * Abdominal pain  Acute problem   Clear liquids   Zofran p.r.n. nausea   Gallbladder ultrasound in a.m.         Obesity (BMI 30.0-34.9)  Chronic  Body mass index is 34.7 kg/m². Morbid obesity complicates all aspects of disease management from diagnostic modalities to treatment. Weight loss encouraged and health benefits explained to patient.      Primary hypertension  Acute problem   Hydralazine p.r.n.           VTE Risk Mitigation (From admission, onward)         Ordered     IP VTE HIGH RISK PATIENT  Once         05/03/23 0242     Place sequential compression device  Until discontinued         05/03/23 0242     Place AVNI hose  Until discontinued         05/03/23 0242                         Mello Fregoso NP  Department of Hospital Medicine  [facility] - Emergency Dept

## 2023-05-03 NOTE — HPI
Jordi Moreno is a 39-year-old male who presents emergency room complaining of abdominal pain and distention.  Symptoms onset 3 days ago.  He reports symptoms progressively worsened over the past 8 hours.  Denies any fever or chills.  No known sick contacts or travel.  No aggravating or alleviating factors.  He does endorse that he placed himself on ampicillin for an upper respiratory infection recently? ?  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 ?  He denies any pertinent previous medical history.  He denies any pertinent surgical history.  He was a nonsmoker.  He denies using any alcohol.  ER workup:  CBC unremarkable.  CMP with creatinine of 1.5, AST 43 and ALT of 80.  Troponin mildly elevated 0.096.  Urinalysis with trace occult blood.  CT the abdomen and pelvis demonstrates some gallbladder thickening without identified stone, recommending gallbladder ultrasound.  Patient was found to be hypertensive on arrival emergency room.  He was given hydralazine with some improvement.  Patient will be admitted to Hospital Medicine for treatment management.

## 2023-05-03 NOTE — SUBJECTIVE & OBJECTIVE
No past medical history on file.    No past surgical history on file.    Review of patient's allergies indicates:  No Known Allergies    No current facility-administered medications on file prior to encounter.     No current outpatient medications on file prior to encounter.     Family History    None       Tobacco Use    Smoking status: Not on file    Smokeless tobacco: Not on file   Substance and Sexual Activity    Alcohol use: Not on file    Drug use: Not on file    Sexual activity: Not on file     Review of Systems   Constitutional:  Positive for activity change and appetite change. Negative for chills, diaphoresis and fever.   HENT:  Negative for congestion, nosebleeds and tinnitus.    Eyes:  Negative for photophobia and visual disturbance.   Respiratory:  Negative for cough, chest tightness, shortness of breath and wheezing.    Cardiovascular:  Negative for chest pain, palpitations and leg swelling.   Gastrointestinal:  Positive for abdominal distention and abdominal pain. Negative for constipation, diarrhea, nausea and vomiting.   Endocrine: Negative for cold intolerance and heat intolerance.   Genitourinary:  Negative for difficulty urinating, dysuria, frequency, hematuria and urgency.   Musculoskeletal:  Negative for arthralgias, back pain and myalgias.   Skin:  Negative for pallor, rash and wound.   Allergic/Immunologic: Negative for immunocompromised state.   Neurological:  Negative for dizziness, tremors, facial asymmetry, speech difficulty and weakness.   Hematological:  Negative for adenopathy. Does not bruise/bleed easily.   Psychiatric/Behavioral:  Negative for confusion and sleep disturbance. The patient is not nervous/anxious.    Objective:     Vital Signs (Most Recent):  Temp: 97.7 °F (36.5 °C) (05/02/23 2323)  Pulse: 107 (05/03/23 0248)  Resp: 20 (05/02/23 2323)  BP: (!) 167/112 (05/03/23 0247)  SpO2: 95 % (05/03/23 0248)   Vital Signs (24h Range):  Temp:  [97.7 °F (36.5 °C)] 97.7 °F (36.5  °C)  Pulse:  [107-122] 107  Resp:  [20] 20  SpO2:  [94 %-100 %] 95 %  BP: (167-221)/(112-139) 167/112     Weight: 106.6 kg (235 lb)  Body mass index is 34.7 kg/m².    Physical Exam  Vitals and nursing note reviewed.   Constitutional:       General: He is not in acute distress.     Appearance: He is well-developed. He is not ill-appearing or diaphoretic.   HENT:      Head: Normocephalic.      Mouth/Throat:      Mouth: Mucous membranes are moist.      Pharynx: Oropharynx is clear.   Eyes:      General: No scleral icterus.     Conjunctiva/sclera: Conjunctivae normal.      Pupils: Pupils are equal, round, and reactive to light.   Neck:      Vascular: No JVD.   Cardiovascular:      Rate and Rhythm: Normal rate and regular rhythm.      Heart sounds: Normal heart sounds. No murmur heard.    No friction rub. No gallop.   Pulmonary:      Effort: Pulmonary effort is normal. No respiratory distress.      Breath sounds: Normal breath sounds. No wheezing or rales.   Abdominal:      General: Bowel sounds are normal. There is no distension.      Palpations: Abdomen is soft.      Tenderness: There is no abdominal tenderness. There is no guarding or rebound.   Musculoskeletal:         General: No tenderness. Normal range of motion.      Cervical back: Normal range of motion and neck supple.   Lymphadenopathy:      Cervical: No cervical adenopathy.   Skin:     General: Skin is warm and dry.      Capillary Refill: Capillary refill takes less than 2 seconds.      Coloration: Skin is not pale.      Findings: No erythema or rash.   Neurological:      Mental Status: He is alert and oriented to person, place, and time.      Cranial Nerves: No cranial nerve deficit.      Sensory: No sensory deficit.      Coordination: Coordination normal.      Deep Tendon Reflexes: Reflexes normal.   Psychiatric:         Behavior: Behavior normal.         Thought Content: Thought content normal.         Judgment: Judgment normal.         CRANIAL NERVES      CN III, IV, VI   Pupils are equal, round, and reactive to light.     Significant Labs: All pertinent labs within the past 24 hours have been reviewed.  CBC:   Recent Labs   Lab 05/02/23  2343   WBC 10.07   HGB 16.9   HCT 49.8        CMP:   Recent Labs   Lab 05/02/23  2343   *   K 4.7      CO2 23   *   BUN 13   CREATININE 1.5*   CALCIUM 8.9   PROT 6.6   ALBUMIN 3.6   BILITOT 0.8   ALKPHOS 63   AST 43*   ALT 80*   ANIONGAP 12     Lipase:   Recent Labs   Lab 05/02/23  2343   LIPASE 24     Urine Studies:   Recent Labs   Lab 05/03/23  0019   COLORU Yellow   APPEARANCEUA Clear   PHUR 6.0   SPECGRAV 1.015   PROTEINUA 3+*   GLUCUA Negative   KETONESU Negative   BILIRUBINUA Negative   OCCULTUA Trace*   NITRITE Negative   UROBILINOGEN Negative   LEUKOCYTESUR Negative   RBCUA 2   WBCUA 0   BACTERIA None   HYALINECASTS 0       Significant Imaging: I have reviewed all pertinent imaging results/findings within the past 24 hours.    CT abdomen pelvis:   Impression:     1. Gallbladder wall thickening without radiodense gallstone.  Findings may be reactive.  Recommend further evaluation with right upper quadrant ultrasound.  2. Trace pleural effusions and suspected interstitial pulmonary edema in the lung bases.  3. Trace perihepatic ascites.

## 2023-05-03 NOTE — ED PROVIDER NOTES
Encounter Date: 5/2/2023       History     Chief Complaint   Patient presents with    Abdominal Pain     Feels distended started 3 days ago     HPI  47 y.o.    Generalized abd cramping, distention x 3 days  No vomiting  Some constipation  No bleeding  No exac/remitting factors  Tried laxative, bicarb w/o much relief  Recently was on augmentin for a lung infection    Review of patient's allergies indicates:  No Known Allergies  Past Medical History:   Diagnosis Date    Asthma      History reviewed. No pertinent surgical history.  History reviewed. No pertinent family history.  Social History     Tobacco Use    Smoking status: Never    Smokeless tobacco: Never   Substance Use Topics    Alcohol use: Yes     Alcohol/week: 5.0 standard drinks     Types: 5 Drinks containing 0.5 oz of alcohol per week    Drug use: Yes     Types: Cocaine     Comment: Pt states occational cocaine use     Review of Systems  All systems were reviewed/examined and were negative except as noted in the HPI.    Physical Exam     Initial Vitals   BP Pulse Resp Temp SpO2   05/02/23 2330 05/02/23 2323 05/02/23 2323 05/02/23 2323 05/02/23 2323   (!) 221/139 (!) 122 20 97.7 °F (36.5 °C) 100 %      MAP       --                Physical Exam    General: the patient is awake, alert, and in no apparent distress.  Head: normocephalic and atraumatic, sclera are clear  Neck: supple without meningismus  Chest:  no respiratory distress  Heart: tachy r rr  ABD soft, nontender, nondistended, no peritoneal signs  Back nt in the midline  Extremities: warm and well perfused  Skin: warm and dry  Psych conversant  Neuro: awake, alert, moving all extremities    ED Course   Procedures  Labs Reviewed   CBC W/ AUTO DIFFERENTIAL - Abnormal; Notable for the following components:       Result Value    RDW 15.6 (*)     Immature Granulocytes 1.8 (*)     Immature Grans (Abs) 0.18 (*)     Gran % 74.5 (*)     Lymph % 14.4 (*)     All other components within normal limits     Narrative:     Release to patient->Immediate   COMPREHENSIVE METABOLIC PANEL - Abnormal; Notable for the following components:    Sodium 135 (*)     Glucose 115 (*)     Creatinine 1.5 (*)     AST 43 (*)     ALT 80 (*)     eGFR 57 (*)     All other components within normal limits    Narrative:     Release to patient->Immediate   URINALYSIS, REFLEX TO URINE CULTURE - Abnormal; Notable for the following components:    Protein, UA 3+ (*)     Occult Blood UA Trace (*)     All other components within normal limits    Narrative:     Specimen Source->Urine   TROPONIN I - Abnormal; Notable for the following components:    Troponin I 0.096 (*)     All other components within normal limits    Narrative:     Release to patient->Immediate   B-TYPE NATRIURETIC PEPTIDE - Abnormal; Notable for the following components:     (*)     All other components within normal limits   TSH - Abnormal; Notable for the following components:    TSH 0.065 (*)     All other components within normal limits   LIPASE    Narrative:     Release to patient->Immediate   ALCOHOL,MEDICAL (ETHANOL)    Narrative:     Release to patient->Immediate   DRUG SCREEN PANEL, URINE EMERGENCY    Narrative:     Specimen Source->Urine   URINALYSIS MICROSCOPIC    Narrative:     Specimen Source->Urine   CK   MAGNESIUM   T4, FREE   HIV 1 / 2 ANTIBODY   HEPATITIS C ANTIBODY        ECG Results              EKG 12-lead (In process)  Result time 05/03/23 07:55:45      In process by Interface, Lab In [facility] (05/03/23 07:55:45)                   Narrative:    Test Reason : R10.84,    Vent. Rate : 117 BPM     Atrial Rate : 117 BPM     P-R Int : 148 ms          QRS Dur : 092 ms      QT Int : 334 ms       P-R-T Axes : 054 052 034 degrees     QTc Int : 465 ms    Sinus tachycardia  Nonspecific T wave abnormality  Abnormal ECG  No previous ECGs available    Referred By: [email or web address]   SELF           Confirmed By:                                   Imaging Results                X-Ray Chest AP Portable (Final result)  Result time 05/03/23 08:07:44      Final result by Mello Dan MD (05/03/23 08:07:44)                   Impression:      Congestive heart failure.  Close interval follow-up is recommended.    This report was flagged in Epic as abnormal.  The preliminary and final reports are concordant.      Electronically signed by: Mello Dan  Date:    05/03/2023  Time:    08:07               Narrative:    EXAMINATION:  XR CHEST AP PORTABLE    CLINICAL HISTORY:  Shortness of breath    TECHNIQUE:  Portable view of the chest was performed.    COMPARISON:  None.    FINDINGS:  Mild diffuse prominence of the pulmonary interstitium most predominant within the perihilar lungs and bilateral lower lobes consistent with pulmonary edema.  Heart size is enlarged.  Trachea midline.    Bony thorax intact.                                       CT Abdomen Pelvis  Without Contrast (Final result)  Result time 05/03/23 00:03:08      Final result by Parish Estrada DO (05/03/23 00:03:08)                   Impression:      1. Gallbladder wall thickening without radiodense gallstone.  Findings may be reactive.  Recommend further evaluation with right upper quadrant ultrasound.  2. Trace pleural effusions and suspected interstitial pulmonary edema in the lung bases.  3. Trace perihepatic ascites.      Electronically signed by: Parish Estrada  Date:    05/03/2023  Time:    00:03               Narrative:    EXAMINATION:  CT ABDOMEN PELVIS WITHOUT CONTRAST    CLINICAL HISTORY:  Abdominal pain, acute, nonlocalized;    TECHNIQUE:  Multiplanar images were obtained of the abdomen and pelvis from the hemidiaphragms through the symphysis pubis without intravenous contrast.    COMPARISON:  None    FINDINGS:  Lung Bases: There is interlobular septal thickening compatible with pulmonary edema.  There are trace bilateral pleural effusions.    Heart: The heart is borderline enlarged.  No pericardial  effusion.    Liver: Normal in size and attenuation without focal hepatic lesion.    Biliary tract: No intrahepatic or extrahepatic biliary ductal dilatation.    Gallbladder: There is nonspecific gallbladder wall thickening which may be reactive.  No radiodense gallstone seen.  The gallbladder is not over distended.    Pancreas: Normal. No pancreatic ductal dilatation.    Spleen: Normal size without focal lesion.    Adrenals: Normal.    Kidneys and urinary collecting systems: Normal.  No hydronephrosis or urolithiasis.    Lymph nodes: None enlarged.    Stomach and bowel: The stomach is normal.  Loops of small and large bowel are normal in caliber without evidence for inflammation or obstruction.  The appendix is normal.    Peritoneum and mesentery: There is trace perihepatic ascites.    Vasculature: Normal.    Urinary bladder: Normal.    Reproductive organs: Prostate is normal.    Body wall: No abnormality.    Musculoskeletal: No aggressive osseous lesion.                                       Medications   sodium chloride 0.9% flush 10 mL (has no administration in time range)   acetaminophen tablet 650 mg (has no administration in time range)   amLODIPine tablet 10 mg (has no administration in time range)   carvediloL tablet 12.5 mg (has no administration in time range)   isosorbide mononitrate 24 hr tablet 60 mg (has no administration in time range)   furosemide injection 20 mg (has no administration in time range)   enoxaparin injection 40 mg (has no administration in time range)   aluminum-magnesium hydroxide-simethicone 200-200-20 mg/5 mL suspension 30 mL (30 mLs Oral Given 5/3/23 0004)     And   LIDOcaine HCl 2% oral solution 15 mL (15 mLs Oral Given 5/3/23 0004)   sodium chloride 0.9% bolus 1,000 mL 1,000 mL (1,000 mLs Intravenous New Bag 5/3/23 0237)   hydrALAZINE injection 10 mg (10 mg Intravenous Given 5/3/23 0222)   hydrALAZINE injection 20 mg (20 mg Intravenous Given 5/3/23 0404)   furosemide injection 40  mg (40 mg Intravenous Given 5/3/23 0639)      Medical Decision Making:    This is an emergent evaluation of a patient presenting to the ED.  Nursing notes were reviewed.  I personally reviewed, read, and interpreted the ECG and any monitoring strips.  ECG: sinus tachycardia Compared with prior if available.  Read and interpreted by me independently.      I reviewed radiology images personally along with interpretations.  ?reactive gallbladder but no stones  No obvious obstruction, large volume ascites    I personally reviewed and interpreted the laboratory results.  Mild elev Cr, mild elev trop  Communicated with another physician regarding patient's care: hosp medicine  I decided to obtain and review old medical records, which showed: no priors    Critical Care Time    Critical care time was provided for 30 minutes exclusive of other billable procedures and teaching time for the treatment of  HTN, cardiovascular   where the potential for death, shock, or further decline was possible.  Critical care time can include documentation, discussion with consultants, developing a care plan, as well as direct patient care.    Ben Maravilla MD    Reviewed findings with patient  Given elev BPs and mildly elev troponin / Cr will elect to observe    Will add on CPK                        Clinical Impression:   Final diagnoses:  [R10.84] Generalized abdominal pain  [R06.02] SOB (shortness of breath)  [I10] Hypertension, unspecified type (Primary)  [R77.8] Elevated troponin  [R79.89] Elevated serum creatinine        ED Disposition Condition    Observation Stable             Kevin Maravilla MD, [facility], [facility]  Department of Emergency Medicine       Ben Maravilla MD  05/03/23 3336

## 2023-05-04 ENCOUNTER — HOSPITAL ENCOUNTER (INPATIENT)
Facility: HOSPITAL | Age: 48
LOS: 2 days | Discharge: HOME | DRG: 286 | End: 2023-05-06
Attending: INTERNAL MEDICINE | Admitting: INTERNAL MEDICINE
Payer: MEDICAID

## 2023-05-04 VITALS
RESPIRATION RATE: 18 BRPM | WEIGHT: 247.13 LBS | SYSTOLIC BLOOD PRESSURE: 166 MMHG | TEMPERATURE: 98 F | DIASTOLIC BLOOD PRESSURE: 95 MMHG | OXYGEN SATURATION: 96 % | HEART RATE: 97 BPM | BODY MASS INDEX: 36.6 KG/M2 | HEIGHT: 69 IN

## 2023-05-04 DIAGNOSIS — R07.9 CHEST PAIN: ICD-10-CM

## 2023-05-04 DIAGNOSIS — I50.41 ACUTE COMBINED SYSTOLIC AND DIASTOLIC CHF, NYHA CLASS 1: Primary | ICD-10-CM

## 2023-05-04 DIAGNOSIS — I42.9 CARDIOMYOPATHY, UNSPECIFIED TYPE: ICD-10-CM

## 2023-05-04 DIAGNOSIS — I42.9 CARDIOMYOPATHY: ICD-10-CM

## 2023-05-04 DIAGNOSIS — I50.9 CHF, ACUTE: ICD-10-CM

## 2023-05-04 PROBLEM — N17.9 AKI (ACUTE KIDNEY INJURY): Status: ACTIVE | Noted: 2023-05-04

## 2023-05-04 PROBLEM — R79.89 ELEVATED LFTS: Status: ACTIVE | Noted: 2023-05-04

## 2023-05-04 PROBLEM — I24.89 DEMAND ISCHEMIA: Status: ACTIVE | Noted: 2023-05-04

## 2023-05-04 PROBLEM — R94.6 ABNORMAL THYROID FUNCTION TEST: Status: ACTIVE | Noted: 2023-05-04

## 2023-05-04 PROBLEM — Z78.9 REGULAR ALCOHOL CONSUMPTION: Status: ACTIVE | Noted: 2023-05-04

## 2023-05-04 PROBLEM — K82.4 GALLBLADDER POLYP: Status: ACTIVE | Noted: 2023-05-03

## 2023-05-04 PROBLEM — R79.89 TROPONIN I ABOVE REFERENCE RANGE: Status: ACTIVE | Noted: 2023-05-04

## 2023-05-04 LAB
ALBUMIN SERPL BCP-MCNC: 3.2 G/DL (ref 3.5–5.2)
ALP SERPL-CCNC: 59 U/L (ref 55–135)
ALT SERPL W/O P-5'-P-CCNC: 61 U/L (ref 10–44)
ANION GAP SERPL CALC-SCNC: 8 MMOL/L (ref 8–16)
AST SERPL-CCNC: 29 U/L (ref 10–40)
BASOPHILS # BLD AUTO: 0.02 K/UL (ref 0–0.2)
BASOPHILS NFR BLD: 0.2 % (ref 0–1.9)
BILIRUB SERPL-MCNC: 1.3 MG/DL (ref 0.1–1)
BUN SERPL-MCNC: 13 MG/DL (ref 6–20)
CALCIUM SERPL-MCNC: 8.9 MG/DL (ref 8.7–10.5)
CHLORIDE SERPL-SCNC: 105 MMOL/L (ref 95–110)
CHOLEST SERPL-MCNC: 104 MG/DL (ref 120–199)
CHOLEST/HDLC SERPL: 2.5 {RATIO} (ref 2–5)
CO2 SERPL-SCNC: 26 MMOL/L (ref 23–29)
CREAT SERPL-MCNC: 1.7 MG/DL (ref 0.5–1.4)
CREAT UR-MCNC: 60.1 MG/DL (ref 23–375)
DIFFERENTIAL METHOD: ABNORMAL
EOSINOPHIL # BLD AUTO: 0.2 K/UL (ref 0–0.5)
EOSINOPHIL NFR BLD: 1.4 % (ref 0–8)
ERYTHROCYTE [DISTWIDTH] IN BLOOD BY AUTOMATED COUNT: 15.7 % (ref 11.5–14.5)
EST. GFR  (NO RACE VARIABLE): 49 ML/MIN/1.73 M^2
ESTIMATED AVG GLUCOSE: 85 MG/DL (ref 68–131)
GLUCOSE SERPL-MCNC: 98 MG/DL (ref 70–110)
HBA1C MFR BLD: 4.6 % (ref 4–5.6)
HCT VFR BLD AUTO: 46.2 % (ref 40–54)
HDLC SERPL-MCNC: 41 MG/DL (ref 40–75)
HDLC SERPL: 39.4 % (ref 20–50)
HGB BLD-MCNC: 15.6 G/DL (ref 14–18)
IMM GRANULOCYTES # BLD AUTO: 0.06 K/UL (ref 0–0.04)
IMM GRANULOCYTES NFR BLD AUTO: 0.6 % (ref 0–0.5)
LACTATE SERPL-SCNC: 1.3 MMOL/L (ref 0.5–1.9)
LDLC SERPL CALC-MCNC: 51.6 MG/DL (ref 63–159)
LYMPHOCYTES # BLD AUTO: 1.4 K/UL (ref 1–4.8)
LYMPHOCYTES NFR BLD: 12.7 % (ref 18–48)
MAGNESIUM SERPL-MCNC: 1.9 MG/DL (ref 1.6–2.6)
MCH RBC QN AUTO: 30.6 PG (ref 27–31)
MCHC RBC AUTO-ENTMCNC: 33.8 G/DL (ref 32–36)
MCV RBC AUTO: 91 FL (ref 82–98)
MICROSCOPIC COMMENT: NORMAL
MONOCYTES # BLD AUTO: 1.3 K/UL (ref 0.3–1)
MONOCYTES NFR BLD: 12.4 % (ref 4–15)
NEUTROPHILS # BLD AUTO: 7.7 K/UL (ref 1.8–7.7)
NEUTROPHILS NFR BLD: 72.7 % (ref 38–73)
NONHDLC SERPL-MCNC: 63 MG/DL
NRBC BLD-RTO: 0 /100 WBC
PLATELET # BLD AUTO: 190 K/UL (ref 150–450)
PMV BLD AUTO: 9.6 FL (ref 9.2–12.9)
POTASSIUM SERPL-SCNC: 4.6 MMOL/L (ref 3.5–5.1)
PROT SERPL-MCNC: 6 G/DL (ref 6–8.4)
PROT UR-MCNC: <7 MG/DL (ref 0–15)
RBC # BLD AUTO: 5.1 M/UL (ref 4.6–6.2)
RBC #/AREA URNS HPF: 1 /HPF (ref 0–4)
SODIUM SERPL-SCNC: 139 MMOL/L (ref 136–145)
SODIUM UR-SCNC: 66 MMOL/L (ref 20–250)
TRIGL SERPL-MCNC: 57 MG/DL (ref 30–150)
TROPONIN I SERPL DL<=0.01 NG/ML-MCNC: 0.26 NG/ML (ref 0–0.03)
UUN UR-MCNC: 286 MG/DL (ref 140–1050)
WBC # BLD AUTO: 10.59 K/UL (ref 3.9–12.7)

## 2023-05-04 PROCEDURE — 99214 OFFICE O/P EST MOD 30 MIN: CPT | Mod: ,,, | Performed by: INTERNAL MEDICINE

## 2023-05-04 PROCEDURE — 85025 COMPLETE CBC W/AUTO DIFF WBC: CPT | Performed by: STUDENT IN AN ORGANIZED HEALTH CARE EDUCATION/TRAINING PROGRAM

## 2023-05-04 PROCEDURE — 84300 ASSAY OF URINE SODIUM: CPT | Performed by: INTERNAL MEDICINE

## 2023-05-04 PROCEDURE — 12000002 HC ACUTE/MED SURGE SEMI-PRIVATE ROOM

## 2023-05-04 PROCEDURE — 93010 ELECTROCARDIOGRAM REPORT: CPT | Mod: ,,, | Performed by: INTERNAL MEDICINE

## 2023-05-04 PROCEDURE — 63600175 PHARM REV CODE 636 W HCPCS: Performed by: STUDENT IN AN ORGANIZED HEALTH CARE EDUCATION/TRAINING PROGRAM

## 2023-05-04 PROCEDURE — 84166 PROTEIN E-PHORESIS/URINE/CSF: CPT | Mod: 26,,, | Performed by: PATHOLOGY

## 2023-05-04 PROCEDURE — 80053 COMPREHEN METABOLIC PANEL: CPT | Performed by: STUDENT IN AN ORGANIZED HEALTH CARE EDUCATION/TRAINING PROGRAM

## 2023-05-04 PROCEDURE — 84540 ASSAY OF URINE/UREA-N: CPT | Performed by: INTERNAL MEDICINE

## 2023-05-04 PROCEDURE — 84166 PROTEIN E-PHORESIS/URINE/CSF: CPT | Performed by: INTERNAL MEDICINE

## 2023-05-04 PROCEDURE — 25000003 PHARM REV CODE 250: Performed by: STUDENT IN AN ORGANIZED HEALTH CARE EDUCATION/TRAINING PROGRAM

## 2023-05-04 PROCEDURE — 94761 N-INVAS EAR/PLS OXIMETRY MLT: CPT

## 2023-05-04 PROCEDURE — 82570 ASSAY OF URINE CREATININE: CPT | Performed by: INTERNAL MEDICINE

## 2023-05-04 PROCEDURE — 36415 COLL VENOUS BLD VENIPUNCTURE: CPT | Performed by: INTERNAL MEDICINE

## 2023-05-04 PROCEDURE — 83605 ASSAY OF LACTIC ACID: CPT | Performed by: INTERNAL MEDICINE

## 2023-05-04 PROCEDURE — 99214 PR OFFICE/OUTPT VISIT, EST, LEVL IV, 30-39 MIN: ICD-10-PCS | Mod: ,,, | Performed by: INTERNAL MEDICINE

## 2023-05-04 PROCEDURE — 21400001 HC TELEMETRY ROOM

## 2023-05-04 PROCEDURE — 84156 ASSAY OF PROTEIN URINE: CPT | Performed by: INTERNAL MEDICINE

## 2023-05-04 PROCEDURE — 83036 HEMOGLOBIN GLYCOSYLATED A1C: CPT | Performed by: STUDENT IN AN ORGANIZED HEALTH CARE EDUCATION/TRAINING PROGRAM

## 2023-05-04 PROCEDURE — 83735 ASSAY OF MAGNESIUM: CPT | Performed by: STUDENT IN AN ORGANIZED HEALTH CARE EDUCATION/TRAINING PROGRAM

## 2023-05-04 PROCEDURE — 84484 ASSAY OF TROPONIN QUANT: CPT | Performed by: STUDENT IN AN ORGANIZED HEALTH CARE EDUCATION/TRAINING PROGRAM

## 2023-05-04 PROCEDURE — 81000 URINALYSIS NONAUTO W/SCOPE: CPT | Performed by: INTERNAL MEDICINE

## 2023-05-04 PROCEDURE — 93005 ELECTROCARDIOGRAM TRACING: CPT | Performed by: INTERNAL MEDICINE

## 2023-05-04 PROCEDURE — 36415 COLL VENOUS BLD VENIPUNCTURE: CPT | Performed by: STUDENT IN AN ORGANIZED HEALTH CARE EDUCATION/TRAINING PROGRAM

## 2023-05-04 PROCEDURE — 80061 LIPID PANEL: CPT | Performed by: STUDENT IN AN ORGANIZED HEALTH CARE EDUCATION/TRAINING PROGRAM

## 2023-05-04 PROCEDURE — 93010 EKG 12-LEAD: ICD-10-PCS | Mod: ,,, | Performed by: INTERNAL MEDICINE

## 2023-05-04 PROCEDURE — 25000003 PHARM REV CODE 250: Performed by: INTERNAL MEDICINE

## 2023-05-04 PROCEDURE — 84166 PATHOLOGIST INTERPRETATION UPE: ICD-10-PCS | Mod: 26,,, | Performed by: PATHOLOGY

## 2023-05-04 RX ORDER — NALOXONE HCL 0.4 MG/ML
0.02 VIAL (ML) INJECTION
Status: DISCONTINUED | OUTPATIENT
Start: 2023-05-04 | End: 2023-05-06 | Stop reason: HOSPADM

## 2023-05-04 RX ORDER — HYDRALAZINE HYDROCHLORIDE 25 MG/1
25 TABLET, FILM COATED ORAL EVERY 8 HOURS
Status: DISCONTINUED | OUTPATIENT
Start: 2023-05-04 | End: 2023-05-04 | Stop reason: HOSPADM

## 2023-05-04 RX ORDER — ACETAMINOPHEN 325 MG/1
650 TABLET ORAL EVERY 4 HOURS PRN
Status: DISCONTINUED | OUTPATIENT
Start: 2023-05-04 | End: 2023-05-06 | Stop reason: HOSPADM

## 2023-05-04 RX ORDER — LANOLIN ALCOHOL/MO/W.PET/CERES
800 CREAM (GRAM) TOPICAL
Status: DISCONTINUED | OUTPATIENT
Start: 2023-05-04 | End: 2023-05-06 | Stop reason: HOSPADM

## 2023-05-04 RX ORDER — ONDANSETRON 2 MG/ML
4 INJECTION INTRAMUSCULAR; INTRAVENOUS EVERY 8 HOURS PRN
Status: DISCONTINUED | OUTPATIENT
Start: 2023-05-04 | End: 2023-05-06 | Stop reason: HOSPADM

## 2023-05-04 RX ORDER — HYDRALAZINE HYDROCHLORIDE 25 MG/1
25 TABLET, FILM COATED ORAL EVERY 8 HOURS
Status: DISCONTINUED | OUTPATIENT
Start: 2023-05-04 | End: 2023-05-05

## 2023-05-04 RX ORDER — GLUCAGON 1 MG
1 KIT INJECTION
Status: DISCONTINUED | OUTPATIENT
Start: 2023-05-04 | End: 2023-05-06 | Stop reason: HOSPADM

## 2023-05-04 RX ORDER — CARVEDILOL 12.5 MG/1
12.5 TABLET ORAL 2 TIMES DAILY
Status: DISCONTINUED | OUTPATIENT
Start: 2023-05-05 | End: 2023-05-05

## 2023-05-04 RX ORDER — SODIUM,POTASSIUM PHOSPHATES 280-250MG
2 POWDER IN PACKET (EA) ORAL
Status: DISCONTINUED | OUTPATIENT
Start: 2023-05-04 | End: 2023-05-06 | Stop reason: HOSPADM

## 2023-05-04 RX ORDER — AMOXICILLIN 250 MG
2 CAPSULE ORAL 2 TIMES DAILY PRN
Status: DISCONTINUED | OUTPATIENT
Start: 2023-05-04 | End: 2023-05-06 | Stop reason: HOSPADM

## 2023-05-04 RX ORDER — SODIUM CHLORIDE 0.9 % (FLUSH) 0.9 %
10 SYRINGE (ML) INJECTION EVERY 6 HOURS PRN
Status: DISCONTINUED | OUTPATIENT
Start: 2023-05-04 | End: 2023-05-06 | Stop reason: HOSPADM

## 2023-05-04 RX ORDER — SODIUM CHLORIDE 0.9 % (FLUSH) 0.9 %
10 SYRINGE (ML) INJECTION
Status: DISCONTINUED | OUTPATIENT
Start: 2023-05-04 | End: 2023-05-04 | Stop reason: HOSPADM

## 2023-05-04 RX ORDER — IBUPROFEN 200 MG
16 TABLET ORAL
Status: DISCONTINUED | OUTPATIENT
Start: 2023-05-04 | End: 2023-05-06 | Stop reason: HOSPADM

## 2023-05-04 RX ORDER — SODIUM CHLORIDE 9 MG/ML
INJECTION, SOLUTION INTRAVENOUS CONTINUOUS
Status: DISCONTINUED | OUTPATIENT
Start: 2023-05-04 | End: 2023-05-05

## 2023-05-04 RX ORDER — AMLODIPINE BESYLATE 5 MG/1
10 TABLET ORAL DAILY
Status: DISCONTINUED | OUTPATIENT
Start: 2023-05-05 | End: 2023-05-06 | Stop reason: HOSPADM

## 2023-05-04 RX ORDER — ENOXAPARIN SODIUM 100 MG/ML
40 INJECTION SUBCUTANEOUS EVERY 24 HOURS
Status: DISCONTINUED | OUTPATIENT
Start: 2023-05-05 | End: 2023-05-06 | Stop reason: HOSPADM

## 2023-05-04 RX ORDER — ISOSORBIDE MONONITRATE 60 MG/1
60 TABLET, EXTENDED RELEASE ORAL DAILY
Status: DISCONTINUED | OUTPATIENT
Start: 2023-05-05 | End: 2023-05-06 | Stop reason: HOSPADM

## 2023-05-04 RX ORDER — SODIUM CHLORIDE, SODIUM LACTATE, POTASSIUM CHLORIDE, CALCIUM CHLORIDE 600; 310; 30; 20 MG/100ML; MG/100ML; MG/100ML; MG/100ML
INJECTION, SOLUTION INTRAVENOUS CONTINUOUS
Status: DISCONTINUED | OUTPATIENT
Start: 2023-05-04 | End: 2023-05-04 | Stop reason: HOSPADM

## 2023-05-04 RX ORDER — NAPROXEN SODIUM 220 MG/1
81 TABLET, FILM COATED ORAL DAILY
Status: DISCONTINUED | OUTPATIENT
Start: 2023-05-05 | End: 2023-05-05

## 2023-05-04 RX ORDER — IBUPROFEN 200 MG
24 TABLET ORAL
Status: DISCONTINUED | OUTPATIENT
Start: 2023-05-04 | End: 2023-05-06 | Stop reason: HOSPADM

## 2023-05-04 RX ADMIN — ENOXAPARIN SODIUM 40 MG: 40 INJECTION SUBCUTANEOUS at 05:05

## 2023-05-04 RX ADMIN — CARVEDILOL 12.5 MG: 6.25 TABLET, FILM COATED ORAL at 08:05

## 2023-05-04 RX ADMIN — HYDRALAZINE HYDROCHLORIDE 25 MG: 25 TABLET, FILM COATED ORAL at 02:05

## 2023-05-04 RX ADMIN — HYDRALAZINE HYDROCHLORIDE 25 MG: 25 TABLET, FILM COATED ORAL at 10:05

## 2023-05-04 RX ADMIN — AMLODIPINE BESYLATE 10 MG: 5 TABLET ORAL at 08:05

## 2023-05-04 RX ADMIN — ISOSORBIDE MONONITRATE 60 MG: 60 TABLET, EXTENDED RELEASE ORAL at 08:05

## 2023-05-04 RX ADMIN — SODIUM CHLORIDE, POTASSIUM CHLORIDE, SODIUM LACTATE AND CALCIUM CHLORIDE: 600; 310; 30; 20 INJECTION, SOLUTION INTRAVENOUS at 12:05

## 2023-05-04 RX ADMIN — FUROSEMIDE 20 MG: 20 TABLET ORAL at 08:05

## 2023-05-04 RX ADMIN — SODIUM CHLORIDE: 0.9 INJECTION, SOLUTION INTRAVENOUS at 10:05

## 2023-05-04 NOTE — PLAN OF CARE
Problem: Adult Inpatient Plan of Care  Goal: Patient-Specific Goal (Individualized)  Outcome: Ongoing, Progressing     Problem: Adult Inpatient Plan of Care  Goal: Absence of Hospital-Acquired Illness or Injury  Outcome: Ongoing, Progressing     Problem: Adult Inpatient Plan of Care  Goal: Absence of Hospital-Acquired Illness or Injury  Outcome: Ongoing, Progressing     Problem: Adult Inpatient Plan of Care  Goal: Optimal Comfort and Wellbeing  Outcome: Ongoing, Progressing     Problem: Adult Inpatient Plan of Care  Goal: Readiness for Transition of Care  Outcome: Ongoing, Progressing

## 2023-05-04 NOTE — CARE UPDATE
05/03/23 0461   Patient Assessment/Suction   Level of Consciousness (AVPU) alert   Respiratory Effort Unlabored   Expansion/Accessory Muscles/Retractions no retractions;no use of accessory muscles   All Lung Fields Breath Sounds clear   Rhythm/Pattern, Respiratory unlabored   PRE-TX-O2   Device (Oxygen Therapy) room air   SpO2 96 %   Pulse Oximetry Type Intermittent   $ Pulse Oximetry - Multiple Charge Pulse Oximetry - Multiple   Pulse 104   Resp 18   Positioning Sitting in bed   Aerosol Therapy   $ Aerosol Therapy Charges PRN treatment not required   Respiratory Treatment Status (SVN) PRN treatment not required

## 2023-05-04 NOTE — SUBJECTIVE & OBJECTIVE
"Interval History: see "Hospital Course"    Review of Systems   Constitutional:  Positive for activity change and appetite change. Negative for chills, diaphoresis and fever.   HENT:  Negative for congestion, nosebleeds and tinnitus.    Eyes:  Negative for photophobia and visual disturbance.   Respiratory:  Negative for cough, chest tightness, shortness of breath and wheezing.    Cardiovascular:  Negative for chest pain, palpitations and leg swelling.   Gastrointestinal:  Positive for abdominal distention and abdominal pain. Negative for constipation, diarrhea, nausea and vomiting.   Endocrine: Negative for cold intolerance and heat intolerance.   Genitourinary:  Negative for difficulty urinating, dysuria, frequency, hematuria and urgency.   Musculoskeletal:  Negative for arthralgias, back pain and myalgias.   Skin:  Negative for pallor, rash and wound.   Allergic/Immunologic: Negative for immunocompromised state.   Neurological:  Negative for dizziness, tremors, facial asymmetry, speech difficulty and weakness.   Hematological:  Negative for adenopathy. Does not bruise/bleed easily.   Psychiatric/Behavioral:  Negative for confusion and sleep disturbance. The patient is not nervous/anxious.    Objective:     Vital Signs (Most Recent):  Temp: 98.4 °F (36.9 °C) (05/04/23 0741)  Pulse: 95 (05/04/23 0825)  Resp: 16 (05/04/23 0741)  BP: (!) 159/95 (05/04/23 0825)  SpO2: 96 % (05/04/23 0741)   Vital Signs (24h Range):  Temp:  [97.5 °F (36.4 °C)-98.4 °F (36.9 °C)] 98.4 °F (36.9 °C)  Pulse:  [] 95  Resp:  [16-18] 16  SpO2:  [95 %-96 %] 96 %  BP: (130-205)/() 159/95     Weight: 112.1 kg (247 lb 2.2 oz)  Body mass index is 36.5 kg/m².    Intake/Output Summary (Last 24 hours) at 5/4/2023 0983  Last data filed at 5/4/2023 0557  Gross per 24 hour   Intake 120 ml   Output 900 ml   Net -780 ml         Physical Exam  Vitals and nursing note reviewed.   Constitutional:       General: He is not in acute distress.     " Appearance: He is well-developed. He is not ill-appearing or diaphoretic.   HENT:      Head: Normocephalic.      Mouth/Throat:      Mouth: Mucous membranes are moist.      Pharynx: Oropharynx is clear.   Eyes:      General: No scleral icterus.     Conjunctiva/sclera: Conjunctivae normal.      Pupils: Pupils are equal, round, and reactive to light.   Neck:      Vascular: No JVD.   Cardiovascular:      Rate and Rhythm: Normal rate and regular rhythm.      Heart sounds: Normal heart sounds. No murmur heard.    No friction rub. No gallop.   Pulmonary:      Effort: Pulmonary effort is normal. No respiratory distress.      Breath sounds: Normal breath sounds. No wheezing or rales.   Abdominal:      General: Bowel sounds are normal. There is no distension.      Palpations: Abdomen is soft.      Tenderness: There is no abdominal tenderness. There is no guarding or rebound.   Musculoskeletal:         General: No tenderness. Normal range of motion.      Cervical back: Normal range of motion and neck supple.   Lymphadenopathy:      Cervical: No cervical adenopathy.   Skin:     General: Skin is warm and dry.      Coloration: Skin is not pale.      Findings: No erythema or rash.   Neurological:      Mental Status: He is alert and oriented to person, place, and time.      Cranial Nerves: No cranial nerve deficit.      Sensory: No sensory deficit.      Coordination: Coordination normal.      Deep Tendon Reflexes: Reflexes normal.   Psychiatric:         Behavior: Behavior normal.         Thought Content: Thought content normal.         Judgment: Judgment normal.           Significant Labs: All pertinent labs within the past 24 hours have been reviewed.    Significant Imaging: I have reviewed all pertinent imaging results/findings within the past 24 hours.

## 2023-05-04 NOTE — ASSESSMENT & PLAN NOTE
Body mass index is 36.5 kg/m². Morbid obesity complicates all aspects of disease management from diagnostic modalities to treatment.

## 2023-05-04 NOTE — ASSESSMENT & PLAN NOTE
In setting of hypertensive emergency.  -Holding Lasix diuresis  -Coreg 12.5 BID  -Hydralazine and Imdur  -Telemetry  -Cardiology consulted  -Keep K > 4 and Mg > 2  -Strict I's and O's  -Cardiology following; will need angiogram

## 2023-05-04 NOTE — HOSPITAL COURSE
Jordi Moreno is a 47 year old male with a past medical history of obesity who presented with abdominal pain secondary to hypertensive emergency with elevated troponin, KRAIG, pulmonary edema with acute combined CHF exacerbation, and elevated LFTs. Cardiology was consulted and is planning for angiogram 5/5/2023. PO BP medications with amlodipine, Coreg, hydralazine and Imdur are currently being titrated. He has been diuresed and Lasix has been held as of 5/4 given KRAIG. He was also given low rate LR IV fluids as well and Nephrology was consulted. Troponin levels peaked to 0.416. TTE shows EF 15% with grade one diastolic dysfunction.

## 2023-05-04 NOTE — ASSESSMENT & PLAN NOTE
In setting of hypertensive emergency and CHF exacerbation.  -Trend troponin  -Telemetry  -BP control  -Cardiology following

## 2023-05-04 NOTE — ASSESSMENT & PLAN NOTE
-Hold Lasix  -Do not start ACE-I or ARB at this time  -Renally dose medications  -Avoid nephrotoxic agents  -BP control  -Monitor UOP and electrolyes  -Trend creatinine

## 2023-05-04 NOTE — PLAN OF CARE
Problem: Adult Inpatient Plan of Care  Goal: Plan of Care Review  Outcome: Ongoing, Progressing  Goal: Patient-Specific Goal (Individualized)  Outcome: Ongoing, Progressing  Goal: Absence of Hospital-Acquired Illness or Injury  Outcome: Ongoing, Progressing  Goal: Optimal Comfort and Wellbeing  Outcome: Ongoing, Progressing  Goal: Readiness for Transition of Care  Outcome: Ongoing, Progressing   Plan of care reviewed with patient,verbalized understanding. . SR on telemetry. Labs monitored. No complaints of SOB or abdominal pain. Remains free from falls, injury. Instructed to call for assistance as needed ,verbalized understanding. Bed in low & locked position. Call light in reach.

## 2023-05-04 NOTE — H&P (VIEW-ONLY)
[facility]  Department of Cardiology  Consult Note      PATIENT NAME: Jordi Moreno    MRN: [id number]  TODAY'S DATE: 05/04/2023  ADMIT DATE: 5/2/2023                          CONSULT REQUESTED BY: Dale Bolden MD    SUBJECTIVE     PRINCIPAL PROBLEM: Acute combined systolic and diastolic CHF, NYHA class 1      05/04/2023:  Denies any new symptoms.  Feels dehydrated.       REASON FOR CONSULT:  Heart failure      Admission HPI:  Jordi Moreno is a 39-year-old male who presents emergency room complaining of abdominal pain and distention.  Symptoms onset 3 days ago.  He reports symptoms progressively worsened over the past 8 hours.  Denies any fever or chills.  No known sick contacts or travel.  No aggravating or alleviating factors.  He does endorse that he placed himself on ampicillin for an upper respiratory infection recently? ?  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 ?  He denies any pertinent previous medical history.  He denies any pertinent surgical history.  He was a nonsmoker.  He denies using any alcohol.  ER workup:  CBC unremarkable.  CMP with creatinine of 1.5, AST 43 and ALT of 80.  Troponin mildly elevated 0.096.  Urinalysis with trace occult blood.  CT the abdomen and pelvis demonstrates some gallbladder thickening without identified stone, recommending gallbladder ultrasound.  Patient was found to be hypertensive on arrival emergency room.  He was given hydralazine with some improvement.  Patient will be admitted to Hospital Medicine for treatment management.    Cardiology consult:  Seen and examined patient at bedside.  Patient feels much better.  Denies any history of heart disease in the past.  States he is physically very fit and did extensive hiking last year.  Presented with abdominal pain and recent onset dyspnea.  Denies any chest pain.  States he had  bronchitis about a month ago    Review of patient's allergies indicates:  No Known  Allergies    Past Medical History:   Diagnosis Date    Asthma      History reviewed. No pertinent surgical history.  Social History     Tobacco Use    Smoking status: Never    Smokeless tobacco: Never   Substance Use Topics    Alcohol use: Yes     Alcohol/week: 5.0 standard drinks     Types: 5 Drinks containing 0.5 oz of alcohol per week    Drug use: Yes     Types: Cocaine     Comment: Pt states occational cocaine use        REVIEW OF SYSTEMS  All other systems negative except as mentioned in HPI.     OBJECTIVE     VITAL SIGNS (Most Recent)  Temp: 98 °F (36.7 °C) (05/04/23 1120)  Pulse: 89 (05/04/23 1120)  Resp: 16 (05/04/23 1120)  BP: 135/85 (05/04/23 1120)  SpO2: 96 % (05/04/23 1120)    VENTILATION STATUS  Resp: 16 (05/04/23 1120)  SpO2: 96 % (05/04/23 1120)           I & O (Last 24H):  Intake/Output Summary (Last 24 hours) at 5/4/2023 1202  Last data filed at 5/4/2023 0557  Gross per 24 hour   Intake 120 ml   Output 900 ml   Net -780 ml       WEIGHTS  Wt Readings from Last 1 Encounters:   05/04/23 0557 112.1 kg (247 lb 2.2 oz)   05/03/23 0735 114.8 kg (253 lb)   05/03/23 0515 114.8 kg (253 lb 1.4 oz)   05/02/23 2323 106.6 kg (235 lb)       PHYSICAL EXAM  GENERAL:  NAD  HEENT: Normocephalic. No pallor/icterus.   NECK: No JVD  CARDIAC: Regular rate and rhythm. S1 is normal.S2 is normal.  No murmurs.   LUNGS:  CTA b/l  ABDOMEN: Soft. Normal bowel sounds.    EXTREMITIES:  No edema/cyanosis.    CNS:  AAO x3, no focal deficits.   SKIN:  No rash.    PSYCH: normal affect    HOME MEDICATIONS:  No current facility-administered medications on file prior to encounter.     Current Outpatient Medications on File Prior to Encounter   Medication Sig Dispense Refill    aspirin 325 MG tablet Take 325 mg by mouth once daily.         SCHEDULED MEDS:   amLODIPine  10 mg Oral Daily    carvediloL  12.5 mg Oral BID    enoxaparin  40 mg Subcutaneous Daily    hydrALAZINE  25 mg Oral Q8H    isosorbide mononitrate  60 mg Oral Daily        CONTINUOUS INFUSIONS:    PRN MEDS:acetaminophen, famotidine, hydrALAZINE, sodium chloride 0.9%    LABS AND DIAGNOSTICS     CBC LAST 3 DAYS  Recent Labs   Lab 05/02/23 2343 05/04/23  0459   WBC 10.07 10.59   RBC 5.54 5.10   HGB 16.9 15.6   HCT 49.8 46.2   MCV 90 91   MCH 30.5 30.6   MCHC 33.9 33.8   RDW 15.6* 15.7*    190   MPV 9.5 9.6   GRAN 74.5*  7.5 72.7  7.7   LYMPH 14.4*  1.5 12.7*  1.4   MONO 8.4  0.9 12.4  1.3*   BASO 0.03 0.02   NRBC 0 0       COAGULATION LAST 3 DAYS  No results for input(s): LABPT, INR, APTT in the last 168 hours.    CHEMISTRY LAST 3 DAYS  Recent Labs   Lab 05/02/23 2343 05/04/23 0459   * 139   K 4.7 4.6    105   CO2 23 26   ANIONGAP 12 8   BUN 13 13   CREATININE 1.5* 1.7*   * 98   CALCIUM 8.9 8.9   MG 1.9 1.9   ALBUMIN 3.6 3.2*   PROT 6.6 6.0   ALKPHOS 63 59   ALT 80* 61*   AST 43* 29   BILITOT 0.8 1.3*       CARDIAC PROFILE LAST 3 DAYS  Recent Labs   Lab 05/02/23 2343 05/03/23  0818 05/03/23  1346 05/03/23 2005 05/04/23  0956   *  --   --   --   --      --   --   --   --    TROPONINI 0.096*   < > 0.218* 0.416* 0.263*    < > = values in this interval not displayed.       ENDOCRINE LAST 3 DAYS  Recent Labs   Lab 05/02/23 2343   TSH 0.065*       LAST ARTERIAL BLOOD GAS  ABG  No results for input(s): PH, PO2, PCO2, HCO3, BE in the last 168 hours.    LAST 7 DAYS MICROBIOLOGY   Microbiology Results (last 7 days)       ** No results found for the last 168 hours. **            MOST RECENT IMAGING  Echo  · The left ventricle is normal in size with mild concentric hypertrophy   and  · Mild tricuspid regurgitation.  · Normal right ventricular size with normal right ventricular systolic   function.  · Normal central venous pressure (3 mmHg).  · The estimated PA systolic pressure is 50 mmHg.  · There is pulmonary hypertension.  · The estimated ejection fraction is 15%.  · Grade I left ventricular diastolic dysfunction.  · There is severe left  ventricular global hypokinesis.     US Abdomen Limited  Narrative: EXAMINATION:  US ABDOMEN LIMITED    CLINICAL HISTORY:  abd distention;    TECHNIQUE:  Limited ultrasound of the right upper quadrant of the abdomen (including pancreas, liver, gallbladder, common bile duct, and spleen) was performed.    COMPARISON:  None.    FINDINGS:  Liver: Normal in size, measuring 15.4 cm. Homogeneous echotexture. No focal hepatic lesions.    Gallbladder: There is diffuse gallbladder wall thickening up to 6 mm diameter.  There are several lobular intraluminal projections measuring between 3-5 mm without associated vascular flow or shadowing.  No discrete cystic spaces or comet tail artifact.  No Woodson sign.  No pericholecystic fluid.    Biliary system: The common duct is mildly dilated, measuring 7 mm.  No intrahepatic ductal dilatation.    Spleen: Normal in size and echotexture, measuring 10.9 cm.    Miscellaneous: Bilateral pleural fluid.  Impression: 1. Gallbladder cholesterolosis with multi polyps measuring up to 5 mm.  Recommend general surgery consultation in an elective setting.  2. Bilateral pleural effusions.  3. Mild common duct dilation. Correlate with bilirubin level as the need for further assessment with ERCP/MRCP.    Electronically signed by: Ab Martin  Date:    05/03/2023  Time:    08:57  X-Ray Chest AP Portable  Narrative: EXAMINATION:  XR CHEST AP PORTABLE    CLINICAL HISTORY:  Shortness of breath    TECHNIQUE:  Portable view of the chest was performed.    COMPARISON:  None.    FINDINGS:  Mild diffuse prominence of the pulmonary interstitium most predominant within the perihilar lungs and bilateral lower lobes consistent with pulmonary edema.  Heart size is enlarged.  Trachea midline.    Bony thorax intact.  Impression: Congestive heart failure.  Close interval follow-up is recommended.    This report was flagged in Epic as abnormal.  The preliminary and final reports are concordant.    Electronically  signed by: Mello Dan  Date:    05/03/2023  Time:    08:07  CT Abdomen Pelvis  Without Contrast  Narrative: EXAMINATION:  CT ABDOMEN PELVIS WITHOUT CONTRAST    CLINICAL HISTORY:  Abdominal pain, acute, nonlocalized;    TECHNIQUE:  Multiplanar images were obtained of the abdomen and pelvis from the hemidiaphragms through the symphysis pubis without intravenous contrast.    COMPARISON:  None    FINDINGS:  Lung Bases: There is interlobular septal thickening compatible with pulmonary edema.  There are trace bilateral pleural effusions.    Heart: The heart is borderline enlarged.  No pericardial effusion.    Liver: Normal in size and attenuation without focal hepatic lesion.    Biliary tract: No intrahepatic or extrahepatic biliary ductal dilatation.    Gallbladder: There is nonspecific gallbladder wall thickening which may be reactive.  No radiodense gallstone seen.  The gallbladder is not over distended.    Pancreas: Normal. No pancreatic ductal dilatation.    Spleen: Normal size without focal lesion.    Adrenals: Normal.    Kidneys and urinary collecting systems: Normal.  No hydronephrosis or urolithiasis.    Lymph nodes: None enlarged.    Stomach and bowel: The stomach is normal.  Loops of small and large bowel are normal in caliber without evidence for inflammation or obstruction.  The appendix is normal.    Peritoneum and mesentery: There is trace perihepatic ascites.    Vasculature: Normal.    Urinary bladder: Normal.    Reproductive organs: Prostate is normal.    Body wall: No abnormality.    Musculoskeletal: No aggressive osseous lesion.  Impression: 1. Gallbladder wall thickening without radiodense gallstone.  Findings may be reactive.  Recommend further evaluation with right upper quadrant ultrasound.  2. Trace pleural effusions and suspected interstitial pulmonary edema in the lung bases.  3. Trace perihepatic ascites.    Electronically signed by: Parish  Natalie  Date:    05/03/2023  Time:    00:03      ECHOCARDIOGRAM RESULTS (last 5)  Results for orders placed during the hospital encounter of 05/02/23    Echo    Interpretation Summary  · The left ventricle is normal in size with mild concentric hypertrophy and  · Mild tricuspid regurgitation.  · Normal right ventricular size with normal right ventricular systolic function.  · Normal central venous pressure (3 mmHg).  · The estimated PA systolic pressure is 50 mmHg.  · There is pulmonary hypertension.  · The estimated ejection fraction is 15%.  · Grade I left ventricular diastolic dysfunction.  · There is severe left ventricular global hypokinesis.      CURRENT/PREVIOUS VISIT EKG  Results for orders placed or performed during the hospital encounter of 05/02/23   EKG 12-lead    Collection Time: 05/02/23 11:57 PM    Narrative    Test Reason : R10.84,    Vent. Rate : 117 BPM     Atrial Rate : 117 BPM     P-R Int : 148 ms          QRS Dur : 092 ms      QT Int : 334 ms       P-R-T Axes : 054 052 034 degrees     QTc Int : 465 ms    Sinus tachycardia  Nonspecific T wave abnormality  Abnormal ECG  No previous ECGs available  Confirmed by Raza Ornelas MD ([id number]) on 5/3/2023 1:55:34 PM    Referred By: [email or web address]   SELF           Confirmed By:Raza Ornelas MD          ASSESSMENT/PLAN:     Active Hospital Problems    Diagnosis    *Acute combined systolic and diastolic CHF, NYHA class 1    KRAIG (acute kidney injury)    Elevated LFTs    Demand ischemia    Gallbladder polyp    Hypertension    Obesity (BMI 30.0-34.9)       ASSESSMENT & PLAN:   New onset HFrEF, severe cardiomyopathy- unclear etiology ischemic vs ETOH vs tachycardia mediated vs viral vs chronic uncontrolled hypertension.   KRAIG versus CKD- unknown baseline    -echo reviewed.  Severely depressed LV function.   -workup for hyperthyroidism.  (Low TSH and sinus tachycardia)  -creatinine trending up.  Patient appears clinically dry.  We will try gentle IV hydration today  and repeat BUN creatinine tomorrow.   -consider nephrology consultation  -right heart catheterization if creatinine is worse tomorrow  -right and left heart catheterization if creatinine is improved tomorrow  -GDMT will be optimized after stabilization of renal function.     Guzman Holbrook MD  [facility]  Department of Cardiology  Date of Service: 05/04/2023

## 2023-05-04 NOTE — PROGRESS NOTES
[facility]  Department of Cardiology  Consult Note      PATIENT NAME: Jordi Moreno    MRN: [id number]  TODAY'S DATE: 05/04/2023  ADMIT DATE: 5/2/2023                          CONSULT REQUESTED BY: Dale Bolden MD    SUBJECTIVE     PRINCIPAL PROBLEM: Acute combined systolic and diastolic CHF, NYHA class 1      05/04/2023:  Denies any new symptoms.  Feels dehydrated.       REASON FOR CONSULT:  Heart failure      Admission HPI:  Jordi Moreno is a 39-year-old male who presents emergency room complaining of abdominal pain and distention.  Symptoms onset 3 days ago.  He reports symptoms progressively worsened over the past 8 hours.  Denies any fever or chills.  No known sick contacts or travel.  No aggravating or alleviating factors.  He does endorse that he placed himself on ampicillin for an upper respiratory infection recently? ?  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 ?  He denies any pertinent previous medical history.  He denies any pertinent surgical history.  He was a nonsmoker.  He denies using any alcohol.  ER workup:  CBC unremarkable.  CMP with creatinine of 1.5, AST 43 and ALT of 80.  Troponin mildly elevated 0.096.  Urinalysis with trace occult blood.  CT the abdomen and pelvis demonstrates some gallbladder thickening without identified stone, recommending gallbladder ultrasound.  Patient was found to be hypertensive on arrival emergency room.  He was given hydralazine with some improvement.  Patient will be admitted to Hospital Medicine for treatment management.    Cardiology consult:  Seen and examined patient at bedside.  Patient feels much better.  Denies any history of heart disease in the past.  States he is physically very fit and did extensive hiking last year.  Presented with abdominal pain and recent onset dyspnea.  Denies any chest pain.  States he had  bronchitis about a month ago    Review of patient's allergies indicates:  No Known  Allergies    Past Medical History:   Diagnosis Date    Asthma      History reviewed. No pertinent surgical history.  Social History     Tobacco Use    Smoking status: Never    Smokeless tobacco: Never   Substance Use Topics    Alcohol use: Yes     Alcohol/week: 5.0 standard drinks     Types: 5 Drinks containing 0.5 oz of alcohol per week    Drug use: Yes     Types: Cocaine     Comment: Pt states occational cocaine use        REVIEW OF SYSTEMS  All other systems negative except as mentioned in HPI.     OBJECTIVE     VITAL SIGNS (Most Recent)  Temp: 98 °F (36.7 °C) (05/04/23 1120)  Pulse: 89 (05/04/23 1120)  Resp: 16 (05/04/23 1120)  BP: 135/85 (05/04/23 1120)  SpO2: 96 % (05/04/23 1120)    VENTILATION STATUS  Resp: 16 (05/04/23 1120)  SpO2: 96 % (05/04/23 1120)           I & O (Last 24H):  Intake/Output Summary (Last 24 hours) at 5/4/2023 1202  Last data filed at 5/4/2023 0557  Gross per 24 hour   Intake 120 ml   Output 900 ml   Net -780 ml       WEIGHTS  Wt Readings from Last 1 Encounters:   05/04/23 0557 112.1 kg (247 lb 2.2 oz)   05/03/23 0735 114.8 kg (253 lb)   05/03/23 0515 114.8 kg (253 lb 1.4 oz)   05/02/23 2323 106.6 kg (235 lb)       PHYSICAL EXAM  GENERAL:  NAD  HEENT: Normocephalic. No pallor/icterus.   NECK: No JVD  CARDIAC: Regular rate and rhythm. S1 is normal.S2 is normal.  No murmurs.   LUNGS:  CTA b/l  ABDOMEN: Soft. Normal bowel sounds.    EXTREMITIES:  No edema/cyanosis.    CNS:  AAO x3, no focal deficits.   SKIN:  No rash.    PSYCH: normal affect    HOME MEDICATIONS:  No current facility-administered medications on file prior to encounter.     Current Outpatient Medications on File Prior to Encounter   Medication Sig Dispense Refill    aspirin 325 MG tablet Take 325 mg by mouth once daily.         SCHEDULED MEDS:   amLODIPine  10 mg Oral Daily    carvediloL  12.5 mg Oral BID    enoxaparin  40 mg Subcutaneous Daily    hydrALAZINE  25 mg Oral Q8H    isosorbide mononitrate  60 mg Oral Daily        CONTINUOUS INFUSIONS:    PRN MEDS:acetaminophen, famotidine, hydrALAZINE, sodium chloride 0.9%    LABS AND DIAGNOSTICS     CBC LAST 3 DAYS  Recent Labs   Lab 05/02/23 2343 05/04/23  0459   WBC 10.07 10.59   RBC 5.54 5.10   HGB 16.9 15.6   HCT 49.8 46.2   MCV 90 91   MCH 30.5 30.6   MCHC 33.9 33.8   RDW 15.6* 15.7*    190   MPV 9.5 9.6   GRAN 74.5*  7.5 72.7  7.7   LYMPH 14.4*  1.5 12.7*  1.4   MONO 8.4  0.9 12.4  1.3*   BASO 0.03 0.02   NRBC 0 0       COAGULATION LAST 3 DAYS  No results for input(s): LABPT, INR, APTT in the last 168 hours.    CHEMISTRY LAST 3 DAYS  Recent Labs   Lab 05/02/23 2343 05/04/23 0459   * 139   K 4.7 4.6    105   CO2 23 26   ANIONGAP 12 8   BUN 13 13   CREATININE 1.5* 1.7*   * 98   CALCIUM 8.9 8.9   MG 1.9 1.9   ALBUMIN 3.6 3.2*   PROT 6.6 6.0   ALKPHOS 63 59   ALT 80* 61*   AST 43* 29   BILITOT 0.8 1.3*       CARDIAC PROFILE LAST 3 DAYS  Recent Labs   Lab 05/02/23 2343 05/03/23  0818 05/03/23  1346 05/03/23 2005 05/04/23  0956   *  --   --   --   --      --   --   --   --    TROPONINI 0.096*   < > 0.218* 0.416* 0.263*    < > = values in this interval not displayed.       ENDOCRINE LAST 3 DAYS  Recent Labs   Lab 05/02/23 2343   TSH 0.065*       LAST ARTERIAL BLOOD GAS  ABG  No results for input(s): PH, PO2, PCO2, HCO3, BE in the last 168 hours.    LAST 7 DAYS MICROBIOLOGY   Microbiology Results (last 7 days)       ** No results found for the last 168 hours. **            MOST RECENT IMAGING  Echo  · The left ventricle is normal in size with mild concentric hypertrophy   and  · Mild tricuspid regurgitation.  · Normal right ventricular size with normal right ventricular systolic   function.  · Normal central venous pressure (3 mmHg).  · The estimated PA systolic pressure is 50 mmHg.  · There is pulmonary hypertension.  · The estimated ejection fraction is 15%.  · Grade I left ventricular diastolic dysfunction.  · There is severe left  ventricular global hypokinesis.     US Abdomen Limited  Narrative: EXAMINATION:  US ABDOMEN LIMITED    CLINICAL HISTORY:  abd distention;    TECHNIQUE:  Limited ultrasound of the right upper quadrant of the abdomen (including pancreas, liver, gallbladder, common bile duct, and spleen) was performed.    COMPARISON:  None.    FINDINGS:  Liver: Normal in size, measuring 15.4 cm. Homogeneous echotexture. No focal hepatic lesions.    Gallbladder: There is diffuse gallbladder wall thickening up to 6 mm diameter.  There are several lobular intraluminal projections measuring between 3-5 mm without associated vascular flow or shadowing.  No discrete cystic spaces or comet tail artifact.  No Woodson sign.  No pericholecystic fluid.    Biliary system: The common duct is mildly dilated, measuring 7 mm.  No intrahepatic ductal dilatation.    Spleen: Normal in size and echotexture, measuring 10.9 cm.    Miscellaneous: Bilateral pleural fluid.  Impression: 1. Gallbladder cholesterolosis with multi polyps measuring up to 5 mm.  Recommend general surgery consultation in an elective setting.  2. Bilateral pleural effusions.  3. Mild common duct dilation. Correlate with bilirubin level as the need for further assessment with ERCP/MRCP.    Electronically signed by: Ab Martin  Date:    05/03/2023  Time:    08:57  X-Ray Chest AP Portable  Narrative: EXAMINATION:  XR CHEST AP PORTABLE    CLINICAL HISTORY:  Shortness of breath    TECHNIQUE:  Portable view of the chest was performed.    COMPARISON:  None.    FINDINGS:  Mild diffuse prominence of the pulmonary interstitium most predominant within the perihilar lungs and bilateral lower lobes consistent with pulmonary edema.  Heart size is enlarged.  Trachea midline.    Bony thorax intact.  Impression: Congestive heart failure.  Close interval follow-up is recommended.    This report was flagged in Epic as abnormal.  The preliminary and final reports are concordant.    Electronically  signed by: Mello Dan  Date:    05/03/2023  Time:    08:07  CT Abdomen Pelvis  Without Contrast  Narrative: EXAMINATION:  CT ABDOMEN PELVIS WITHOUT CONTRAST    CLINICAL HISTORY:  Abdominal pain, acute, nonlocalized;    TECHNIQUE:  Multiplanar images were obtained of the abdomen and pelvis from the hemidiaphragms through the symphysis pubis without intravenous contrast.    COMPARISON:  None    FINDINGS:  Lung Bases: There is interlobular septal thickening compatible with pulmonary edema.  There are trace bilateral pleural effusions.    Heart: The heart is borderline enlarged.  No pericardial effusion.    Liver: Normal in size and attenuation without focal hepatic lesion.    Biliary tract: No intrahepatic or extrahepatic biliary ductal dilatation.    Gallbladder: There is nonspecific gallbladder wall thickening which may be reactive.  No radiodense gallstone seen.  The gallbladder is not over distended.    Pancreas: Normal. No pancreatic ductal dilatation.    Spleen: Normal size without focal lesion.    Adrenals: Normal.    Kidneys and urinary collecting systems: Normal.  No hydronephrosis or urolithiasis.    Lymph nodes: None enlarged.    Stomach and bowel: The stomach is normal.  Loops of small and large bowel are normal in caliber without evidence for inflammation or obstruction.  The appendix is normal.    Peritoneum and mesentery: There is trace perihepatic ascites.    Vasculature: Normal.    Urinary bladder: Normal.    Reproductive organs: Prostate is normal.    Body wall: No abnormality.    Musculoskeletal: No aggressive osseous lesion.  Impression: 1. Gallbladder wall thickening without radiodense gallstone.  Findings may be reactive.  Recommend further evaluation with right upper quadrant ultrasound.  2. Trace pleural effusions and suspected interstitial pulmonary edema in the lung bases.  3. Trace perihepatic ascites.    Electronically signed by: Parish  Natalie  Date:    05/03/2023  Time:    00:03      ECHOCARDIOGRAM RESULTS (last 5)  Results for orders placed during the hospital encounter of 05/02/23    Echo    Interpretation Summary  · The left ventricle is normal in size with mild concentric hypertrophy and  · Mild tricuspid regurgitation.  · Normal right ventricular size with normal right ventricular systolic function.  · Normal central venous pressure (3 mmHg).  · The estimated PA systolic pressure is 50 mmHg.  · There is pulmonary hypertension.  · The estimated ejection fraction is 15%.  · Grade I left ventricular diastolic dysfunction.  · There is severe left ventricular global hypokinesis.      CURRENT/PREVIOUS VISIT EKG  Results for orders placed or performed during the hospital encounter of 05/02/23   EKG 12-lead    Collection Time: 05/02/23 11:57 PM    Narrative    Test Reason : R10.84,    Vent. Rate : 117 BPM     Atrial Rate : 117 BPM     P-R Int : 148 ms          QRS Dur : 092 ms      QT Int : 334 ms       P-R-T Axes : 054 052 034 degrees     QTc Int : 465 ms    Sinus tachycardia  Nonspecific T wave abnormality  Abnormal ECG  No previous ECGs available  Confirmed by Raza Ornelas MD ([id number]) on 5/3/2023 1:55:34 PM    Referred By: [email or web address]   SELF           Confirmed By:Raza Ornelas MD          ASSESSMENT/PLAN:     Active Hospital Problems    Diagnosis    *Acute combined systolic and diastolic CHF, NYHA class 1    KRAIG (acute kidney injury)    Elevated LFTs    Demand ischemia    Gallbladder polyp    Hypertension    Obesity (BMI 30.0-34.9)       ASSESSMENT & PLAN:   New onset HFrEF, severe cardiomyopathy- unclear etiology ischemic vs ETOH vs tachycardia mediated vs viral vs chronic uncontrolled hypertension.   KRAIG versus CKD- unknown baseline    -echo reviewed.  Severely depressed LV function.   -workup for hyperthyroidism.  (Low TSH and sinus tachycardia)  -creatinine trending up.  Patient appears clinically dry.  We will try gentle IV hydration today  and repeat BUN creatinine tomorrow.   -consider nephrology consultation  -right heart catheterization if creatinine is worse tomorrow  -right and left heart catheterization if creatinine is improved tomorrow  -GDMT will be optimized after stabilization of renal function.     Guzman Holbrook MD  [facility]  Department of Cardiology  Date of Service: 05/04/2023

## 2023-05-04 NOTE — PROGRESS NOTES
[facility] Hospital Medicine  Progress Note    Patient Name: Jordi Moreno  MRN: [id number]  Patient Class: OP- Observation   Admission Date: 5/2/2023  Length of Stay: 0 days  Attending Physician: Dale Bolden MD  Primary Care Provider: Primary Doctor No        Subjective:     Principal Problem:Acute combined systolic and diastolic CHF, NYHA class 1        HPI:  Jordi Moreno is a 39-year-old male who presents emergency room complaining of abdominal pain and distention.  Symptoms onset 3 days ago.  He reports symptoms progressively worsened over the past 8 hours.  Denies any fever or chills.  No known sick contacts or travel.  No aggravating or alleviating factors.  He does endorse that he placed himself on ampicillin for an upper respiratory infection recently? ?  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 ?  He denies any pertinent previous medical history.  He denies any pertinent surgical history.  He was a nonsmoker.  He denies using any alcohol.  ER workup:  CBC unremarkable.  CMP with creatinine of 1.5, AST 43 and ALT of 80.  Troponin mildly elevated 0.096.  Urinalysis with trace occult blood.  CT the abdomen and pelvis demonstrates some gallbladder thickening without identified stone, recommending gallbladder ultrasound.  Patient was found to be hypertensive on arrival emergency room.  He was given hydralazine with some improvement.  Patient will be admitted to Hospital Medicine for treatment management.      Overview/Hospital Course:  Jordi Moreno is a 47 year old male with a past medical history of obesity who presented with abdominal pain secondary to hypertensive emergency with elevated troponin, KRAIG, pulmonary edema with acute combined CHF exacerbation, and elevated LFTs. Cardiology has been consulted and is planning for angiogram once kidney function improves. PO BP medications with amlodipine, Coreg, hydralazine and Imdur are currently being  "titrated. He has been diuresed and Lasix has been held as of 5/4 given KRAIG. Troponin continues to be trended. TTE shows EF 15% with grade one diastolic dysfunction.      Interval History: see "Hospital Course"    Review of Systems   Constitutional:  Positive for activity change and appetite change. Negative for chills, diaphoresis and fever.   HENT:  Negative for congestion, nosebleeds and tinnitus.    Eyes:  Negative for photophobia and visual disturbance.   Respiratory:  Negative for cough, chest tightness, shortness of breath and wheezing.    Cardiovascular:  Negative for chest pain, palpitations and leg swelling.   Gastrointestinal:  Positive for abdominal distention and abdominal pain. Negative for constipation, diarrhea, nausea and vomiting.   Endocrine: Negative for cold intolerance and heat intolerance.   Genitourinary:  Negative for difficulty urinating, dysuria, frequency, hematuria and urgency.   Musculoskeletal:  Negative for arthralgias, back pain and myalgias.   Skin:  Negative for pallor, rash and wound.   Allergic/Immunologic: Negative for immunocompromised state.   Neurological:  Negative for dizziness, tremors, facial asymmetry, speech difficulty and weakness.   Hematological:  Negative for adenopathy. Does not bruise/bleed easily.   Psychiatric/Behavioral:  Negative for confusion and sleep disturbance. The patient is not nervous/anxious.    Objective:     Vital Signs (Most Recent):  Temp: 98.4 °F (36.9 °C) (05/04/23 0741)  Pulse: 95 (05/04/23 0825)  Resp: 16 (05/04/23 0741)  BP: (!) 159/95 (05/04/23 0825)  SpO2: 96 % (05/04/23 0741)   Vital Signs (24h Range):  Temp:  [97.5 °F (36.4 °C)-98.4 °F (36.9 °C)] 98.4 °F (36.9 °C)  Pulse:  [] 95  Resp:  [16-18] 16  SpO2:  [95 %-96 %] 96 %  BP: (130-205)/() 159/95     Weight: 112.1 kg (247 lb 2.2 oz)  Body mass index is 36.5 kg/m².    Intake/Output Summary (Last 24 hours) at 5/4/2023 0937  Last data filed at 5/4/2023 0557  Gross per 24 hour "   Intake 120 ml   Output 900 ml   Net -780 ml         Physical Exam  Vitals and nursing note reviewed.   Constitutional:       General: He is not in acute distress.     Appearance: He is well-developed. He is not ill-appearing or diaphoretic.   HENT:      Head: Normocephalic.      Mouth/Throat:      Mouth: Mucous membranes are moist.      Pharynx: Oropharynx is clear.   Eyes:      General: No scleral icterus.     Conjunctiva/sclera: Conjunctivae normal.      Pupils: Pupils are equal, round, and reactive to light.   Neck:      Vascular: No JVD.   Cardiovascular:      Rate and Rhythm: Normal rate and regular rhythm.      Heart sounds: Normal heart sounds. No murmur heard.    No friction rub. No gallop.   Pulmonary:      Effort: Pulmonary effort is normal. No respiratory distress.      Breath sounds: Normal breath sounds. No wheezing or rales.   Abdominal:      General: Bowel sounds are normal. There is no distension.      Palpations: Abdomen is soft.      Tenderness: There is no abdominal tenderness. There is no guarding or rebound.   Musculoskeletal:         General: No tenderness. Normal range of motion.      Cervical back: Normal range of motion and neck supple.   Lymphadenopathy:      Cervical: No cervical adenopathy.   Skin:     General: Skin is warm and dry.      Coloration: Skin is not pale.      Findings: No erythema or rash.   Neurological:      Mental Status: He is alert and oriented to person, place, and time.      Cranial Nerves: No cranial nerve deficit.      Sensory: No sensory deficit.      Coordination: Coordination normal.      Deep Tendon Reflexes: Reflexes normal.   Psychiatric:         Behavior: Behavior normal.         Thought Content: Thought content normal.         Judgment: Judgment normal.           Significant Labs: All pertinent labs within the past 24 hours have been reviewed.    Significant Imaging: I have reviewed all pertinent imaging results/findings within the past 24  hours.      Assessment/Plan:      * Acute combined systolic and diastolic CHF, NYHA class 1  In setting of hypertensive emergency.  -Holding Lasix diuresis  -Coreg 12.5 BID  -Hydralazine and Imdur  -Telemetry  -Cardiology consulted  -Keep K > 4 and Mg > 2  -Strict I's and O's  -Cardiology following; will need angiogram      Demand ischemia  In setting of hypertensive emergency and CHF exacerbation.  -Trend troponin  -Telemetry  -BP control  -Cardiology following      KRAIG (acute kidney injury)  -Hold Lasix  -Do not start ACE-I or ARB at this time  -Renally dose medications  -Avoid nephrotoxic agents  -BP control  -Monitor UOP and electrolyes  -Trend creatinine      Elevated LFTs  Improving.  -BP control  -Trend LFTs      Obesity (BMI 30.0-34.9)  Body mass index is 36.5 kg/m². Morbid obesity complicates all aspects of disease management from diagnostic modalities to treatment.       Hypertension  -Amlodipine 10  -Coreg 12.5 BID  -Hydralazine 25 Q8H  -Imdur 60  -Continue to monitor      Gallbladder polyp  -Will need referral to Surgery in the outpatient setting        VTE Risk Mitigation (From admission, onward)         Ordered     enoxaparin injection 40 mg  Daily         05/03/23 0759     IP VTE HIGH RISK PATIENT  Once         05/03/23 0242     Place sequential compression device  Until discontinued         05/03/23 0242     Place AVNI hose  Until discontinued         05/03/23 0242                Discharge Planning   ANUJA: 5/5/2023     Code Status: Full Code   Is the patient medically ready for discharge?:     Reason for patient still in hospital (select all that apply): Patient trending condition, Laboratory test, Treatment, Imaging, Consult recommendations and Pending disposition  Discharge Plan A: Home                  Dale Bolden MD  Department of Hospital Medicine   [facility]

## 2023-05-05 LAB
ALBUMIN SERPL BCP-MCNC: 3.3 G/DL (ref 3.5–5.2)
ALP SERPL-CCNC: 53 U/L (ref 55–135)
ALT SERPL W/O P-5'-P-CCNC: 44 U/L (ref 10–44)
ANION GAP SERPL CALC-SCNC: 6 MMOL/L (ref 8–16)
AST SERPL-CCNC: 18 U/L (ref 10–40)
BASOPHILS # BLD AUTO: 0.02 K/UL (ref 0–0.2)
BASOPHILS NFR BLD: 0.2 % (ref 0–1.9)
BILIRUB SERPL-MCNC: 0.9 MG/DL (ref 0.1–1)
BNP SERPL-MCNC: 531 PG/ML (ref 0–99)
BUN SERPL-MCNC: 19 MG/DL (ref 6–20)
CALCIUM SERPL-MCNC: 8.7 MG/DL (ref 8.7–10.5)
CHLORIDE SERPL-SCNC: 104 MMOL/L (ref 95–110)
CO2 SERPL-SCNC: 29 MMOL/L (ref 23–29)
CREAT SERPL-MCNC: 1.7 MG/DL (ref 0.5–1.4)
DIFFERENTIAL METHOD: ABNORMAL
EOSINOPHIL # BLD AUTO: 0.1 K/UL (ref 0–0.5)
EOSINOPHIL NFR BLD: 1.5 % (ref 0–8)
ERYTHROCYTE [DISTWIDTH] IN BLOOD BY AUTOMATED COUNT: 16 % (ref 11.5–14.5)
EST. GFR  (NO RACE VARIABLE): 49.1 ML/MIN/1.73 M^2
GLUCOSE SERPL-MCNC: 98 MG/DL (ref 70–110)
HCT VFR BLD AUTO: 47.1 % (ref 40–54)
HGB BLD-MCNC: 15.6 G/DL (ref 14–18)
IMM GRANULOCYTES # BLD AUTO: 0.07 K/UL (ref 0–0.04)
IMM GRANULOCYTES NFR BLD AUTO: 0.8 % (ref 0–0.5)
INR PPP: 1.1 (ref 0.8–1.2)
LYMPHOCYTES # BLD AUTO: 1.7 K/UL (ref 1–4.8)
LYMPHOCYTES NFR BLD: 20.1 % (ref 18–48)
MAGNESIUM SERPL-MCNC: 2.1 MG/DL (ref 1.6–2.6)
MCH RBC QN AUTO: 30.4 PG (ref 27–31)
MCHC RBC AUTO-ENTMCNC: 33.1 G/DL (ref 32–36)
MCV RBC AUTO: 92 FL (ref 82–98)
MONOCYTES # BLD AUTO: 1 K/UL (ref 0.3–1)
MONOCYTES NFR BLD: 12 % (ref 4–15)
NEUTROPHILS # BLD AUTO: 5.6 K/UL (ref 1.8–7.7)
NEUTROPHILS NFR BLD: 65.4 % (ref 38–73)
NRBC BLD-RTO: 0 /100 WBC
PLATELET # BLD AUTO: 180 K/UL (ref 150–450)
PMV BLD AUTO: 9.6 FL (ref 9.2–12.9)
POTASSIUM SERPL-SCNC: 4.8 MMOL/L (ref 3.5–5.1)
PROT SERPL-MCNC: 6.2 G/DL (ref 6–8.4)
PROTHROMBIN TIME: 11.8 SEC (ref 9–12.5)
RBC # BLD AUTO: 5.14 M/UL (ref 4.6–6.2)
SODIUM SERPL-SCNC: 139 MMOL/L (ref 136–145)
TROPONIN I SERPL HS-MCNC: 128 PG/ML (ref 0–14.9)
WBC # BLD AUTO: 8.52 K/UL (ref 3.9–12.7)

## 2023-05-05 PROCEDURE — 25500020 PHARM REV CODE 255: Performed by: INTERNAL MEDICINE

## 2023-05-05 PROCEDURE — 83880 ASSAY OF NATRIURETIC PEPTIDE: CPT | Performed by: INTERNAL MEDICINE

## 2023-05-05 PROCEDURE — 80053 COMPREHEN METABOLIC PANEL: CPT | Performed by: INTERNAL MEDICINE

## 2023-05-05 PROCEDURE — 99152 MOD SED SAME PHYS/QHP 5/>YRS: CPT | Performed by: INTERNAL MEDICINE

## 2023-05-05 PROCEDURE — 99223 1ST HOSP IP/OBS HIGH 75: CPT | Mod: 25,,, | Performed by: INTERNAL MEDICINE

## 2023-05-05 PROCEDURE — 99223 PR INITIAL HOSPITAL CARE,LEVL III: ICD-10-PCS | Mod: 25,,, | Performed by: INTERNAL MEDICINE

## 2023-05-05 PROCEDURE — C1894 INTRO/SHEATH, NON-LASER: HCPCS | Performed by: INTERNAL MEDICINE

## 2023-05-05 PROCEDURE — 27201423 OPTIME MED/SURG SUP & DEVICES STERILE SUPPLY: Performed by: INTERNAL MEDICINE

## 2023-05-05 PROCEDURE — 21400001 HC TELEMETRY ROOM

## 2023-05-05 PROCEDURE — 84481 FREE ASSAY (FT-3): CPT | Performed by: INTERNAL MEDICINE

## 2023-05-05 PROCEDURE — 25000003 PHARM REV CODE 250: Performed by: INTERNAL MEDICINE

## 2023-05-05 PROCEDURE — 93458 PR CATH PLACE/CORON ANGIO, IMG SUPER/INTERP,W LEFT HEART VENTRICULOGRAPHY: ICD-10-PCS | Mod: 26,,, | Performed by: INTERNAL MEDICINE

## 2023-05-05 PROCEDURE — 63600175 PHARM REV CODE 636 W HCPCS: Performed by: INTERNAL MEDICINE

## 2023-05-05 PROCEDURE — 99152 MOD SED SAME PHYS/QHP 5/>YRS: CPT | Mod: ,,, | Performed by: INTERNAL MEDICINE

## 2023-05-05 PROCEDURE — 99152 PR MOD CONSCIOUS SEDATION, SAME PHYS, 5+ YRS, FIRST 15 MIN: ICD-10-PCS | Mod: ,,, | Performed by: INTERNAL MEDICINE

## 2023-05-05 PROCEDURE — 83735 ASSAY OF MAGNESIUM: CPT | Performed by: INTERNAL MEDICINE

## 2023-05-05 PROCEDURE — 85610 PROTHROMBIN TIME: CPT | Performed by: INTERNAL MEDICINE

## 2023-05-05 PROCEDURE — 93458 L HRT ARTERY/VENTRICLE ANGIO: CPT | Performed by: INTERNAL MEDICINE

## 2023-05-05 PROCEDURE — 36415 COLL VENOUS BLD VENIPUNCTURE: CPT | Performed by: INTERNAL MEDICINE

## 2023-05-05 PROCEDURE — C1769 GUIDE WIRE: HCPCS | Performed by: INTERNAL MEDICINE

## 2023-05-05 PROCEDURE — 85025 COMPLETE CBC W/AUTO DIFF WBC: CPT | Performed by: INTERNAL MEDICINE

## 2023-05-05 PROCEDURE — 93458 L HRT ARTERY/VENTRICLE ANGIO: CPT | Mod: 26,,, | Performed by: INTERNAL MEDICINE

## 2023-05-05 PROCEDURE — 84484 ASSAY OF TROPONIN QUANT: CPT | Performed by: INTERNAL MEDICINE

## 2023-05-05 PROCEDURE — C1887 CATHETER, GUIDING: HCPCS | Performed by: INTERNAL MEDICINE

## 2023-05-05 RX ORDER — HEPARIN SODIUM 10000 [USP'U]/ML
INJECTION, SOLUTION INTRAVENOUS; SUBCUTANEOUS
Status: DISCONTINUED | OUTPATIENT
Start: 2023-05-05 | End: 2023-05-05 | Stop reason: HOSPADM

## 2023-05-05 RX ORDER — TALC
9 POWDER (GRAM) TOPICAL NIGHTLY PRN
Status: DISCONTINUED | OUTPATIENT
Start: 2023-05-05 | End: 2023-05-06 | Stop reason: HOSPADM

## 2023-05-05 RX ORDER — HYDRALAZINE HYDROCHLORIDE 25 MG/1
50 TABLET, FILM COATED ORAL EVERY 8 HOURS
Status: DISCONTINUED | OUTPATIENT
Start: 2023-05-05 | End: 2023-05-06 | Stop reason: HOSPADM

## 2023-05-05 RX ORDER — MIDAZOLAM HYDROCHLORIDE 1 MG/ML
INJECTION INTRAMUSCULAR; INTRAVENOUS
Status: DISCONTINUED | OUTPATIENT
Start: 2023-05-05 | End: 2023-05-05 | Stop reason: HOSPADM

## 2023-05-05 RX ORDER — IODIXANOL 320 MG/ML
INJECTION, SOLUTION INTRAVASCULAR
Status: DISCONTINUED | OUTPATIENT
Start: 2023-05-05 | End: 2023-05-05 | Stop reason: HOSPADM

## 2023-05-05 RX ORDER — LIDOCAINE HYDROCHLORIDE 10 MG/ML
INJECTION, SOLUTION EPIDURAL; INFILTRATION; INTRACAUDAL; PERINEURAL
Status: DISCONTINUED | OUTPATIENT
Start: 2023-05-05 | End: 2023-05-05 | Stop reason: HOSPADM

## 2023-05-05 RX ORDER — FENTANYL CITRATE 50 UG/ML
INJECTION, SOLUTION INTRAMUSCULAR; INTRAVENOUS
Status: DISCONTINUED | OUTPATIENT
Start: 2023-05-05 | End: 2023-05-05 | Stop reason: HOSPADM

## 2023-05-05 RX ORDER — CARVEDILOL 6.25 MG/1
6.25 TABLET ORAL 2 TIMES DAILY
Status: DISCONTINUED | OUTPATIENT
Start: 2023-05-05 | End: 2023-05-06 | Stop reason: HOSPADM

## 2023-05-05 RX ORDER — NITROGLYCERIN 5 MG/ML
INJECTION, SOLUTION INTRAVENOUS
Status: DISCONTINUED | OUTPATIENT
Start: 2023-05-05 | End: 2023-05-05 | Stop reason: HOSPADM

## 2023-05-05 RX ADMIN — HYDRALAZINE HYDROCHLORIDE 50 MG: 25 TABLET, FILM COATED ORAL at 04:05

## 2023-05-05 RX ADMIN — AMLODIPINE BESYLATE 10 MG: 5 TABLET ORAL at 08:05

## 2023-05-05 RX ADMIN — ENOXAPARIN SODIUM 40 MG: 100 INJECTION SUBCUTANEOUS at 09:05

## 2023-05-05 RX ADMIN — ISOSORBIDE MONONITRATE 60 MG: 60 TABLET, EXTENDED RELEASE ORAL at 08:05

## 2023-05-05 RX ADMIN — HYDRALAZINE HYDROCHLORIDE 25 MG: 25 TABLET, FILM COATED ORAL at 05:05

## 2023-05-05 RX ADMIN — HYDRALAZINE HYDROCHLORIDE 50 MG: 25 TABLET, FILM COATED ORAL at 09:05

## 2023-05-05 RX ADMIN — HYDRALAZINE HYDROCHLORIDE 25 MG: 25 TABLET, FILM COATED ORAL at 01:05

## 2023-05-05 RX ADMIN — CARVEDILOL 6.25 MG: 6.25 TABLET, FILM COATED ORAL at 09:05

## 2023-05-05 RX ADMIN — CARVEDILOL 12.5 MG: 12.5 TABLET, FILM COATED ORAL at 08:05

## 2023-05-05 NOTE — HOSPITAL COURSE
Josh is a 47-year-old male who presented as a direct admission from [facility] for interventional cardiology evaluation for consideration of cardiac catheterization found new acute onset heart failure.  Mechanism of new onset heart failure is unclear possible viral/alcohol/possible ischemic origin initially .  LHC was done and negative angiogram and cardiomyopathy most likely alcohol related since patient drink a lot daily.  Patient was started on heart failure medications and diuretics and volume status improved and off oxygen and doing well and walking around.  Patient was discharged with low-dose diuretics, ACE inhibitor and patient not able to afford Entresto and Cardiology reviewed in 1-2 weeks

## 2023-05-05 NOTE — NURSING
PRE PROCEDURE VISIT COMPLETE. PATIENT ALERT AND ORIENTED X3. ALL QUESTIONS ANSWERED ABOUT PROCEDURE. PATIENT DENIES FURTHER QUESTIONS OR CONCERNS.

## 2023-05-05 NOTE — HPI
Mr. Moreno is a 47-year-old male who presented as a direct admission from [facility] for interventional cardiology evaluation for consideration of cardiac catheterization.  Patient initially presented to outside hospital on 5/2 with 3 day history of abdominal distention and bloating, decreased oral intake, dark urine, constipation.  On presentation noted to have significantly elevated blood pressure (221/135), labs with creatinine 1.5 (unclear baseline), , high sensitivity troponin 0.096 with subsequently peaked at 0.416, TSH low at 0.065 with normal free T4, UDS negative, ethanol negative, CT abdomen and pelvis with concern for pulmonary edema, gallbladder polyp.  He was admitted, initially started on IV Lasix, cardiology consulted.  Subsequent echocardiogram with EF of 15% with grade 1 diastolic dysfunction with PASP 50.  Patient denies any known past medical conditions, states he has not checked his blood pressure in the last year but previously was controlled, no significant past surgical history.  Does admit to regular alcohol consumption.  States his mother has history of congestive heart failure, did require stents, has history of hypertension and diabetes. Blood pressure medications were initiated at outside hospital, seen by Cardiology today, recommends slow IV fluid hydration overnight and plans for RHC versus RHC/LHC tomorrow depending a.m. labs.  Patient does report bilateral chest tightness but denies any chest pain or dyspnea on exertion, does report nonproductive intermittent cough, states his urine is now clear, reports his GI symptoms has also improved.  Plan of care discussed with patient.  RN in room during my encounter.

## 2023-05-05 NOTE — H&P
[facility] Medicine  History & Physical    Patient Name: Jordi Moreno  MRN: [id number]  Patient Class: IP- Inpatient  Admission Date: 5/4/2023  Attending Physician: Zully Malik MD   Primary Care Provider: Primary Doctor No         Patient information was obtained from patient, past medical records and ER records.     Subjective:     Principal Problem:Cardiomyopathy    Chief Complaint: Abdominal distention      HPI: Mr. Moreno is a 47-year-old male who presented as a direct admission from [facility] for interventional cardiology evaluation for consideration of cardiac catheterization.  Patient initially presented to outside hospital on 5/2 with 3 day history of abdominal distention and bloating, decreased oral intake, dark urine, constipation.  On presentation noted to have significantly elevated blood pressure (221/135), labs with creatinine 1.5 (unclear baseline), , high sensitivity troponin 0.096 with subsequently peaked at 0.416, TSH low at 0.065 with normal free T4, UDS negative, ethanol negative, CT abdomen and pelvis with concern for pulmonary edema, gallbladder polyp.  He was admitted, initially started on IV Lasix, cardiology consulted.  Subsequent echocardiogram with EF of 15% with grade 1 diastolic dysfunction with PASP 50.  Patient denies any known past medical conditions, states he has not checked his blood pressure in the last year but previously was controlled, no significant past surgical history.  Does admit to regular alcohol consumption.  States his mother has history of congestive heart failure, did require stents, has history of hypertension and diabetes. Blood pressure medications were initiated at outside hospital, seen by Cardiology today, recommends slow IV fluid hydration overnight and plans for RHC versus RHC/LHC tomorrow depending a.m. labs.  Patient does report bilateral chest tightness but denies any chest pain or dyspnea on exertion, does report  nonproductive intermittent cough, states his urine is now clear, reports his GI symptoms has also improved.  Plan of care discussed with patient.  RN in room during my encounter.      Past Medical History:   Diagnosis Date    Asthma        History reviewed. No pertinent surgical history.    Review of patient's allergies indicates:  No Known Allergies    Current Facility-Administered Medications on File Prior to Encounter   Medication    [DISCONTINUED] acetaminophen tablet 650 mg    [DISCONTINUED] amLODIPine tablet 10 mg    [DISCONTINUED] carvediloL tablet 12.5 mg    [DISCONTINUED] enoxaparin injection 40 mg    [DISCONTINUED] famotidine tablet 20 mg    [DISCONTINUED] furosemide tablet 20 mg    [DISCONTINUED] hydrALAZINE injection 10 mg    [DISCONTINUED] hydrALAZINE tablet 25 mg    [DISCONTINUED] isosorbide mononitrate 24 hr tablet 60 mg    [DISCONTINUED] lactated ringers infusion    [DISCONTINUED] sodium chloride 0.9% flush 10 mL    [DISCONTINUED] sodium chloride 0.9% flush 10 mL     Current Outpatient Medications on File Prior to Encounter   Medication Sig    aspirin 325 MG tablet Take 325 mg by mouth once daily.     Family History       Problem Relation (Age of Onset)    Diabetes Mother    Heart disease Mother    Hypertension Mother          Tobacco Use    Smoking status: Never    Smokeless tobacco: Never   Substance and Sexual Activity    Alcohol use: Yes     Alcohol/week: 5.0 standard drinks     Types: 5 Drinks containing 0.5 oz of alcohol per week     Comment: about 8 drinks on the weekend    Drug use: Yes     Types: Cocaine     Comment: Pt states occational cocaine use    Sexual activity: Not on file     Review of Systems   Constitutional:  Negative for chills and fever.   HENT:  Negative for congestion.    Respiratory:  Positive for cough and chest tightness.    Gastrointestinal:  Positive for constipation. Negative for nausea and vomiting.   Genitourinary:  Negative for dysuria.   Skin:  Negative for wound.    Allergic/Immunologic: Negative for immunocompromised state.   Neurological:  Negative for headaches.   Psychiatric/Behavioral:  Negative for confusion.    Objective:     Vital Signs (Most Recent):  Temp: 97.3 °F (36.3 °C) (05/04/23 2100)  Pulse: 95 (05/04/23 2100)  Resp: 18 (05/04/23 2100)  BP: (!) 161/97 (05/04/23 2100)  SpO2: 95 % (05/04/23 2100)   Vital Signs (24h Range):  Temp:  [97.3 °F (36.3 °C)-98.4 °F (36.9 °C)] 97.3 °F (36.3 °C)  Pulse:  [80-97] 95  Resp:  [16-18] 18  SpO2:  [95 %-97 %] 95 %  BP: (135-166)/(80-97) 161/97     Weight: 112.1 kg (247 lb 1.5 oz)  Body mass index is 36.49 kg/m².     Physical Exam  Vitals and nursing note reviewed.   Constitutional:       General: He is not in acute distress.     Appearance: He is obese. He is not ill-appearing, toxic-appearing or diaphoretic.   HENT:      Head: Normocephalic and atraumatic.      Mouth/Throat:      Mouth: Mucous membranes are moist.   Eyes:      General:         Right eye: No discharge.         Left eye: No discharge.      Extraocular Movements: Extraocular movements intact.      Conjunctiva/sclera: Conjunctivae normal.      Pupils: Pupils are equal, round, and reactive to light.   Cardiovascular:      Rate and Rhythm: Normal rate and regular rhythm.      Comments: Warm peripheries, no LE edema  Pulmonary:      Comments: On RA, distant breath sounds, no wheeze or accessory muscle use  Abdominal:      General: Bowel sounds are normal. There is no distension.      Palpations: Abdomen is soft.      Tenderness: There is no abdominal tenderness. There is no guarding.   Skin:     General: Skin is warm and dry.      Comments: +tattoos present   Neurological:      General: No focal deficit present.      Mental Status: He is alert and oriented to person, place, and time. Mental status is at baseline.   Psychiatric:         Mood and Affect: Mood normal.            CRANIAL NERVES     CN III, IV, VI   Pupils are equal, round, and reactive to light.      Significant Labs: Blood Culture: No results for input(s): LABBLOO in the last 48 hours.  BMP:   Recent Labs   Lab 05/04/23 0459   GLU 98      K 4.6      CO2 26   BUN 13   CREATININE 1.7*   CALCIUM 8.9   MG 1.9     CBC:   Recent Labs   Lab 05/02/23 2343 05/04/23 0459   WBC 10.07 10.59   HGB 16.9 15.6   HCT 49.8 46.2    190     CMP:   Recent Labs   Lab 05/02/23 2343 05/04/23 0459   * 139   K 4.7 4.6    105   CO2 23 26   * 98   BUN 13 13   CREATININE 1.5* 1.7*   CALCIUM 8.9 8.9   PROT 6.6 6.0   ALBUMIN 3.6 3.2*   BILITOT 0.8 1.3*   ALKPHOS 63 59   AST 43* 29   ALT 80* 61*   ANIONGAP 12 8     Cardiac Markers:   Recent Labs   Lab 05/02/23 2343   *     Lactic Acid:   Recent Labs   Lab 05/04/23  2128   LACTATE 1.3     Magnesium:   Recent Labs   Lab 05/02/23 2343 05/04/23 0459   MG 1.9 1.9     POCT Glucose: No results for input(s): POCTGLUCOSE in the last 48 hours.  Respiratory Culture: No results for input(s): GSRESP, RESPIRATORYC in the last 48 hours.  Troponin:   Recent Labs   Lab 05/03/23  1346 05/03/23 2005 05/04/23  0956   TROPONINI 0.218* 0.416* 0.263*     TSH:   Recent Labs   Lab 05/02/23 2343   TSH 0.065*     Urine Culture: No results for input(s): LABURIN in the last 48 hours.  Urine Studies:   Recent Labs   Lab 05/03/23  0019 05/04/23  1334   COLORU Yellow  --    APPEARANCEUA Clear  --    PHUR 6.0  --    SPECGRAV 1.015  --    PROTEINUA 3+*  --    GLUCUA Negative  --    KETONESU Negative  --    BILIRUBINUA Negative  --    OCCULTUA Trace*  --    NITRITE Negative  --    UROBILINOGEN Negative  --    LEUKOCYTESUR Negative  --    RBCUA 2 1   WBCUA 0  --    BACTERIA None  --    HYALINECASTS 0  --        Significant Imaging: I have reviewed all pertinent imaging results/findings within the past 24 hours.    X-Ray Chest AP Portable    Result Date: 5/3/2023  EXAMINATION: XR CHEST AP PORTABLE CLINICAL HISTORY: Shortness of breath TECHNIQUE: Portable view of the chest  was performed. COMPARISON: None. FINDINGS: Mild diffuse prominence of the pulmonary interstitium most predominant within the perihilar lungs and bilateral lower lobes consistent with pulmonary edema.  Heart size is enlarged.  Trachea midline. Bony thorax intact.     Congestive heart failure.  Close interval follow-up is recommended. This report was flagged in Epic as abnormal.  The preliminary and final reports are concordant. Electronically signed by: Mello Dan Date:    05/03/2023 Time:    08:07    Echo    Result Date: 5/3/2023  · The left ventricle is normal in size with mild concentric hypertrophy and · Mild tricuspid regurgitation. · Normal right ventricular size with normal right ventricular systolic function. · Normal central venous pressure (3 mmHg). · The estimated PA systolic pressure is 50 mmHg. · There is pulmonary hypertension. · The estimated ejection fraction is 15%. · Grade I left ventricular diastolic dysfunction. · There is severe left ventricular global hypokinesis.      US Abdomen Limited    Result Date: 5/3/2023  EXAMINATION: US ABDOMEN LIMITED CLINICAL HISTORY: abd distention; TECHNIQUE: Limited ultrasound of the right upper quadrant of the abdomen (including pancreas, liver, gallbladder, common bile duct, and spleen) was performed. COMPARISON: None. FINDINGS: Liver: Normal in size, measuring 15.4 cm. Homogeneous echotexture. No focal hepatic lesions. Gallbladder: There is diffuse gallbladder wall thickening up to 6 mm diameter.  There are several lobular intraluminal projections measuring between 3-5 mm without associated vascular flow or shadowing.  No discrete cystic spaces or comet tail artifact.  No Woodson sign.  No pericholecystic fluid. Biliary system: The common duct is mildly dilated, measuring 7 mm.  No intrahepatic ductal dilatation. Spleen: Normal in size and echotexture, measuring 10.9 cm. Miscellaneous: Bilateral pleural fluid.     1. Gallbladder cholesterolosis with multi  polyps measuring up to 5 mm.  Recommend general surgery consultation in an elective setting. 2. Bilateral pleural effusions. 3. Mild common duct dilation. Correlate with bilirubin level as the need for further assessment with ERCP/MRCP. Electronically signed by: Ab Martin Date:    05/03/2023 Time:    08:57    CT Abdomen Pelvis  Without Contrast    Result Date: 5/3/2023  EXAMINATION: CT ABDOMEN PELVIS WITHOUT CONTRAST CLINICAL HISTORY: Abdominal pain, acute, nonlocalized; TECHNIQUE: Multiplanar images were obtained of the abdomen and pelvis from the hemidiaphragms through the symphysis pubis without intravenous contrast. COMPARISON: None FINDINGS: Lung Bases: There is interlobular septal thickening compatible with pulmonary edema.  There are trace bilateral pleural effusions. Heart: The heart is borderline enlarged.  No pericardial effusion. Liver: Normal in size and attenuation without focal hepatic lesion. Biliary tract: No intrahepatic or extrahepatic biliary ductal dilatation. Gallbladder: There is nonspecific gallbladder wall thickening which may be reactive.  No radiodense gallstone seen.  The gallbladder is not over distended. Pancreas: Normal. No pancreatic ductal dilatation. Spleen: Normal size without focal lesion. Adrenals: Normal. Kidneys and urinary collecting systems: Normal.  No hydronephrosis or urolithiasis. Lymph nodes: None enlarged. Stomach and bowel: The stomach is normal.  Loops of small and large bowel are normal in caliber without evidence for inflammation or obstruction.  The appendix is normal. Peritoneum and mesentery: There is trace perihepatic ascites. Vasculature: Normal. Urinary bladder: Normal. Reproductive organs: Prostate is normal. Body wall: No abnormality. Musculoskeletal: No aggressive osseous lesion.     1. Gallbladder wall thickening without radiodense gallstone.  Findings may be reactive.  Recommend further evaluation with right upper quadrant ultrasound. 2. Trace  pleural effusions and suspected interstitial pulmonary edema in the lung bases. 3. Trace perihepatic ascites. Electronically signed by: Parish Estrada Date:    05/03/2023 Time:    00:03       Assessment/Plan:     Active Hospital Problems    Diagnosis    *Cardiomyopathy    KRAIG VS CKD    Troponin I above reference range    Regular alcohol consumption    Acute combined systolic and diastolic congestive heart failure    Abnormal thyroid function test    Gallbladder polyp    Hypertension    Obesity (BMI 30.0-34.9)     Plan:  Admit inpatient, continuous cardiac telemetry monitoring  NPO midnight pending cardiology evaluation   Difficult clinical volume assessment, previous laboratory and radiology evidence of volume overload with  and CXR with pulmonary edema, cardiology recommends holding further IV lasix and IV fluids overnight with trending renal function, will monitor closely  KRAIG vs CKD, unclear baseline, UA +3 protein but random urine protein < 7, urine sodium > 20 goes against pre renal KRAIG, imaging no evidence of obstruction,  ddx hypertensive nephrosclerosis vs cardiorenal vs other  Echocardiogram with EF of 15% with grade 1 diastolic dysfunction with PASP 50   Currently on Imdur 60, amlodipine 10, Coreg 12.5, hydralazine 25 mg as per Cardiology, monitor blood pressure adjust as needed   Initiated goal-directed therapy for cardiomyopathy as per hemodynamics and renal function   Renally dose all medications avoid nephrotoxin drugs   Repeat BNP and chest x-ray in a.m.  Electrolytes sliding scale repletion  A.m. labs ordered  Cardiology consulted   Nephrology consulted   Further plan as per hospital course    VTE Risk Mitigation (From admission, onward)           Ordered     enoxaparin injection 40 mg  Daily         05/04/23 2158     IP VTE HIGH RISK PATIENT  Once         05/04/23 2158     Place sequential compression device  Until discontinued         05/04/23 2158                               Zully MATTHEWS  MD [place]  Department of Hospital Medicine  [facility]

## 2023-05-05 NOTE — PROGRESS NOTES
[facility] Medicine  Progress Note    Patient Name: Jordi Moreno  MRN: [id number]  Patient Class: IP- Inpatient   Admission Date: 5/4/2023  Length of Stay: 1 days  Attending Physician: Daniel Riojas MD  Primary Care Provider: Primary Doctor No        Subjective:     Principal Problem:Cardiomyopathy        HPI:  Mr. Moreno is a 47-year-old male who presented as a direct admission from [facility] for interventional cardiology evaluation for consideration of cardiac catheterization.  Patient initially presented to outside hospital on 5/2 with 3 day history of abdominal distention and bloating, decreased oral intake, dark urine, constipation.  On presentation noted to have significantly elevated blood pressure (221/135), labs with creatinine 1.5 (unclear baseline), , high sensitivity troponin 0.096 with subsequently peaked at 0.416, TSH low at 0.065 with normal free T4, UDS negative, ethanol negative, CT abdomen and pelvis with concern for pulmonary edema, gallbladder polyp.  He was admitted, initially started on IV Lasix, cardiology consulted.  Subsequent echocardiogram with EF of 15% with grade 1 diastolic dysfunction with PASP 50.  Patient denies any known past medical conditions, states he has not checked his blood pressure in the last year but previously was controlled, no significant past surgical history.  Does admit to regular alcohol consumption.  States his mother has history of congestive heart failure, did require stents, has history of hypertension and diabetes. Blood pressure medications were initiated at outside hospital, seen by Cardiology today, recommends slow IV fluid hydration overnight and plans for RHC versus RHC/LHC tomorrow depending a.m. labs.  Patient does report bilateral chest tightness but denies any chest pain or dyspnea on exertion, does report nonproductive intermittent cough, states his urine is now clear, reports his GI symptoms has also improved.  Plan of care  discussed with patient.  RN in room during my encounter.      Overview/Hospital Course:  Seen earlier , before cath   Later appear negative [facility].  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    Intake/Output Summary (Last 24 hours) at 5/5/2023 1828  Last data filed at 5/5/2023 1325  Gross per 24 hour   Intake 270 ml   Output 1150 ml   Net -880 ml         Physical Exam  Constitutional:       General: He is not in acute distress.     Appearance: He is well-developed.   HENT:      Head: Normocephalic.   Eyes:      Pupils: Pupils are equal, round, and reactive to light.   Cardiovascular:      Rate and Rhythm: Normal rate and regular rhythm.   Pulmonary:      Effort: Pulmonary effort is normal. No respiratory distress.      Breath sounds: Normal breath sounds.   Abdominal:      General: There is no distension.      Tenderness: There is no abdominal tenderness.   Musculoskeletal:      Cervical back: Neck supple.   Skin:     Findings: No rash.   Neurological:      Mental Status: He is alert and oriented to person, place, and time.           Significant Labs: All pertinent labs within the past 24 hours have been reviewed.  CBC:   Recent Labs   Lab 05/04/23 0459 05/05/23 0429   WBC 10.59 8.52   HGB 15.6 15.6   HCT 46.2 47.1    180     CMP:   Recent Labs   Lab 05/04/23 0459 05/05/23 0429    139   K 4.6 4.8    104   CO2 26 29   GLU 98 98   BUN 13 19    CREATININE 1.7* 1.7*   CALCIUM 8.9 8.7   PROT 6.0 6.2   ALBUMIN 3.2* 3.3*   BILITOT 1.3* 0.9   ALKPHOS 59 53*   AST 29 18   ALT 61* 44   ANIONGAP 8 6*     Cardiac Markers:   Recent Labs   Lab 05/05/23  0429   *       Significant Imaging: I have reviewed all pertinent imaging results/findings within the past 24 hours.      Assessment/Plan:      Active Hospital Problems    Diagnosis    *Cardiomyopathy    KRAIG VS CKD    Troponin I above reference range    Regular alcohol consumption    Acute combined systolic and diastolic congestive heart failure    Abnormal thyroid function test    Gallbladder polyp    Hypertension    Obesity (BMI 30.0-34.9)     Plan:  [facility] appear neg   keep  holding further IV lasix  Echocardiogram with EF of 15% with grade 1 diastolic dysfunction with PASP 50   CHF medications and IV lasix if renal function better   Repeat renal function tomorrow   Decreased carvedilol and increased hyhdralazine   Cardiac transplant referral ?  Live vest at discharge       VTE Risk Mitigation (From admission, onward)         Ordered     enoxaparin injection 40 mg  Daily         05/04/23 2158     IP VTE HIGH RISK PATIENT  Once         05/04/23 2158     Place sequential compression device  Until discontinued         05/04/23 2158                Discharge Planning   ANUJA:      Code Status: Full Code   Is the patient medically ready for discharge?:     Reason for patient still in hospital (select all that apply): Treatment  Discharge Plan A: Home                  Daniel Riojas MD  Department of Hospital Medicine   [facility]

## 2023-05-05 NOTE — DISCHARGE SUMMARY
[facility] Medicine  Discharge Summary      Patient Name: Jordi Morneo  MRN: [id number]  QIANA: [id number]  Patient Class: IP- Inpatient  Admission Date: 5/2/2023  Hospital Length of Stay: 1 days  Discharge Date and Time: 5/4/2023  8:30 PM  Attending Physician: No att. providers found   Discharging Provider: Dale Bolden MD  Primary Care Provider: Primary Doctor No    Primary Care Team: Networked reference to record PCT     HPI:   Jordi Moreno is a 39-year-old male who presents emergency room complaining of abdominal pain and distention.  Symptoms onset 3 days ago.  He reports symptoms progressively worsened over the past 8 hours.  Denies any fever or chills.  No known sick contacts or travel.  No aggravating or alleviating factors.  He does endorse that he placed himself on ampicillin for an upper respiratory infection recently? ?  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 ?  He denies any pertinent previous medical history.  He denies any pertinent surgical history.  He was a nonsmoker.  He denies using any alcohol.  ER workup:  CBC unremarkable.  CMP with creatinine of 1.5, AST 43 and ALT of 80.  Troponin mildly elevated 0.096.  Urinalysis with trace occult blood.  CT the abdomen and pelvis demonstrates some gallbladder thickening without identified stone, recommending gallbladder ultrasound.  Patient was found to be hypertensive on arrival emergency room.  He was given hydralazine with some improvement.  Patient will be admitted to Hospital Medicine for treatment management.      * No surgery found *      Hospital Course:   Jordi Moreno is a 47 year old male with a past medical history of obesity who presented with abdominal pain secondary to hypertensive emergency with elevated troponin, KRAIG, pulmonary edema with acute combined CHF exacerbation, and elevated LFTs. Cardiology was consulted and is planning for angiogram 5/5/2023. PO BP medications with  amlodipine, Coreg, hydralazine and Imdur are currently being titrated. He has been diuresed and Lasix has been held as of 5/4 given KRAIG. He was also given low rate LR IV fluids as well and Nephrology was consulted. Troponin levels peaked to 0.416. TTE shows EF 15% with grade one diastolic dysfunction.        Goals of Care Treatment Preferences:  Code Status: Full Code      Consults:   Consults (From admission, onward)        Status Ordering Provider     Inpatient consult to Cardiology  Once        Provider:  Guzman Holbrook MD    Completed ARIS CARABALLO          Cardiac/Vascular  * Acute combined systolic and diastolic CHF, NYHA class 1  In setting of hypertensive emergency.  -Holding Lasix diuresis  -Coreg 12.5 BID  -Hydralazine and Imdur  -Telemetry  -Cardiology consulted  -Keep K > 4 and Mg > 2  -Strict I's and O's  -Cardiology following; will need angiogram      Hypertension  -Amlodipine 10  -Coreg 12.5 BID  -Hydralazine 25 Q8H  -Imdur 60  -Continue to monitor      Demand ischemia  In setting of hypertensive emergency and CHF exacerbation.  -Trend troponin  -Telemetry  -BP control  -Cardiology following      Renal/  KRAIG VS CKD  -Hold Lasix  -Do not start ACE-I or ARB at this time  -Renally dose medications  -Avoid nephrotoxic agents  -BP control  -Monitor UOP and electrolyes  -Trend creatinine      Endocrine  Obesity (BMI 30.0-34.9)  Body mass index is 36.5 kg/m². Morbid obesity complicates all aspects of disease management from diagnostic modalities to treatment.       GI  Elevated LFTs  Improving.  -BP control  -Trend LFTs      Gallbladder polyp  -Will need referral to Surgery in the outpatient setting        Final Active Diagnoses:    Diagnosis Date Noted POA    PRINCIPAL PROBLEM:  Acute combined systolic and diastolic CHF, NYHA class 1 [I50.41] 05/04/2023 Yes    KRAIG VS CKD [N17.9] 05/04/2023 Yes    Demand ischemia [I24.8] 05/04/2023 Yes    Elevated LFTs [R79.89] 05/04/2023 Yes     Gallbladder polyp [K82.4] 05/03/2023 Yes    Hypertension [I10] 05/03/2023 Yes    Obesity (BMI 30.0-34.9) [E66.9] 05/03/2023 Yes      Problems Resolved During this Admission:       Discharged Condition: stable    Disposition: Another Health Care Inst*    Follow Up:    Patient Instructions:   No discharge procedures on file.    Significant Diagnostic Studies: Labs: All labs within the past 24 hours have been reviewed    Pending Diagnostic Studies:     Procedure Component Value Units Date/Time    Protein Electrophoresis, Random Urine [[id number]] Collected: 05/04/23 1334    Order Status: Sent Lab Status: In process Updated: 05/04/23 2116    Specimen: Urine, Clean Catch          Medications:  Transfer Medications (for Discharge Readmit only):   No current facility-administered medications for this encounter.     No current outpatient medications on file.     Facility-Administered Medications Ordered in Other Encounters   Medication Dose Route Frequency Provider Last Rate Last Admin    0.9%  NaCl infusion   Intravenous Continuous Zully Malik MD 50 mL/hr at 05/04/23 2218 New Bag at 05/04/23 2218    acetaminophen tablet 650 mg  650 mg Oral Q4H PRN Zully Malik MD        amLODIPine tablet 10 mg  10 mg Oral Daily Zully Malik MD        aspirin chewable tablet 81 mg  81 mg Oral Daily Zully Malik MD        carvediloL tablet 12.5 mg  12.5 mg Oral BID Zully Malik MD        dextrose 50% injection 12.5 g  12.5 g Intravenous PRN Zully Malik MD        dextrose 50% injection 25 g  25 g Intravenous PRN Zully Malik MD        enoxaparin injection 40 mg  40 mg Subcutaneous Daily Zully Malik MD        glucagon (human recombinant) injection 1 mg  1 mg Intramuscular PRN Zully Malik MD        glucose chewable tablet 16 g  16 g Oral PRN Zully Malik MD        glucose chewable tablet 24 g  24 g Oral PRN Zully Malik MD        hydrALAZINE tablet 25 mg  25 mg  Oral Q8H Zully Malik MD   25 mg at 05/05/23 0556    isosorbide mononitrate 24 hr tablet 60 mg  60 mg Oral Daily Zully Malik MD        magnesium oxide tablet 800 mg  800 mg Oral PRN Zully Malik MD        magnesium oxide tablet 800 mg  800 mg Oral PRN Zully Malik MD        naloxone 0.4 mg/mL injection 0.02 mg  0.02 mg Intravenous PRKARINA Malik MD        ondansetron injection 4 mg  4 mg Intravenous Q8H MIHAELAN Zully Malik MD        potassium bicarbonate disintegrating tablet 35 mEq  35 mEq Oral PRKARINA Malik MD        potassium bicarbonate disintegrating tablet 50 mEq  50 mEq Oral PRKARINA Malik MD        potassium bicarbonate disintegrating tablet 60 mEq  60 mEq Oral MEDINA Malik MD        potassium, sodium phosphates 280-160-250 mg packet 2 packet  2 packet Oral PRKARINA Malik MD        potassium, sodium phosphates 280-160-250 mg packet 2 packet  2 packet Oral PRN Zully Malik MD        potassium, sodium phosphates 280-160-250 mg packet 2 packet  2 packet Oral PRKARINA Malik MD        senna-docusate 8.6-50 mg per tablet 2 tablet  2 tablet Oral BID MEDINA Malik MD        sodium chloride 0.9% flush 10 mL  10 mL Intravenous Q6H MEDINA Malik MD           Indwelling Lines/Drains at time of discharge:   Lines/Drains/Airways     None                 Time spent on the discharge of patient: 34 minutes         Dale Bolden MD  Department of Hospital Medicine  [facility]

## 2023-05-05 NOTE — PROVIDER TRANSFER
Outside Transfer Acceptance Note / Regional Referral Center    Referring facility: [facility]   Referring provider: ARIS CARABALLO  Accepting facility: [facility]  Accepting provider: MATTHIAS FAN ZAW W.  Admitting provider: Dr. Riojas  Reason for transfer: Higher Level of Care  Transfer diagnosis: Acute congestive heart failure  Transfer specialty requested: Cardiology  Transfer specialty notified: yes  Transfer level: NUMBER 1-5: 2  Bed type requested: med tele   Isolation status: No active isolations   Admission class or status: IP- Inpatient      Narrative     Pt is a 39-year-old male without any pertinent previous medical history who presents emergency room complaining of abdominal pain and distention.  Symptoms onset 3 days prior to admission and progressively worsened over the past 8 hours. While in the ED, pt CMP with creatinine of 1.5, AST 43 and ALT of 80.  Troponin mildly elevated 0.096. TTE shows EF 15% with grade one diastolic dysfunction. CT the abdomen and pelvis demonstrates some gallbladder thickening without identified stone, recommending gallbladder ultrasound.  Patient was found to be hypertensive on arrival emergency room. He was admitted for further management of hypertensive emergency with elevated troponin, KRAIG, pulmonary edema with acute combined CHF exacerbation, and elevated LFTs. Cardiology was been consulted and was planning for angiogram once kidney function improves. PO BP medications with amlodipine, Coreg, hydralazine and Imdur are currently being titrated. He has been diuresed and Lasix has been held as of 5/4 given KRAIG. Cardiology was consulted. Per cardiology, creatinine trending up.  Patient appears clinically dry.  Will try gentle IV hydration today and repeat BUN creatinine tomorrow. Plan for right heart catheterization if creatinine is worse tomorrow vs right and left heart catheterization if creatinine is improved tomorrow. GDMT  will be optimized after stabilization of renal function. Pt to be transferred over to [facility] with hospital medicine admitting and cardiology consulting.     Objective     Vitals: Temp: 98 °F (36.7 °C) (05/04/23 1930)  Pulse: 97 (05/04/23 1930)  Resp: 18 (05/04/23 1930)  BP: (!) 166/95 (05/04/23 1930)  SpO2: 96 % (05/04/23 1930)    Recent Labs: All pertinent labs within the past 24 hours have been reviewed.  CBC:   Recent Labs   Lab 05/02/23 2343 05/04/23 0459   WBC 10.07 10.59   HGB 16.9 15.6   HCT 49.8 46.2    190     CMP:   Recent Labs   Lab 05/02/23 2343 05/04/23 0459   * 139   K 4.7 4.6    105   CO2 23 26   * 98   BUN 13 13   CREATININE 1.5* 1.7*   CALCIUM 8.9 8.9   PROT 6.6 6.0   ALBUMIN 3.6 3.2*   BILITOT 0.8 1.3*   ALKPHOS 63 59   AST 43* 29   ALT 80* 61*   ANIONGAP 12 8       Recent imaging:   Imaging Results               X-Ray Chest AP Portable (Final result)  Result time 05/03/23 08:07:44      Final result by Mello Dan MD (05/03/23 08:07:44)                   Impression:      Congestive heart failure.  Close interval follow-up is recommended.    This report was flagged in Epic as abnormal.  The preliminary and final reports are concordant.      Electronically signed by: Mello Dan  Date:    05/03/2023  Time:    08:07               Narrative:    EXAMINATION:  XR CHEST AP PORTABLE    CLINICAL HISTORY:  Shortness of breath    TECHNIQUE:  Portable view of the chest was performed.    COMPARISON:  None.    FINDINGS:  Mild diffuse prominence of the pulmonary interstitium most predominant within the perihilar lungs and bilateral lower lobes consistent with pulmonary edema.  Heart size is enlarged.  Trachea midline.    Bony thorax intact.                                       CT Abdomen Pelvis  Without Contrast (Final result)  Result time 05/03/23 00:03:08      Final result by Parish Estrada DO (05/03/23 00:03:08)                   Impression:      1. Gallbladder wall  thickening without radiodense gallstone.  Findings may be reactive.  Recommend further evaluation with right upper quadrant ultrasound.  2. Trace pleural effusions and suspected interstitial pulmonary edema in the lung bases.  3. Trace perihepatic ascites.      Electronically signed by: Parish Estrada  Date:    05/03/2023  Time:    00:03               Narrative:    EXAMINATION:  CT ABDOMEN PELVIS WITHOUT CONTRAST    CLINICAL HISTORY:  Abdominal pain, acute, nonlocalized;    TECHNIQUE:  Multiplanar images were obtained of the abdomen and pelvis from the hemidiaphragms through the symphysis pubis without intravenous contrast.    COMPARISON:  None    FINDINGS:  Lung Bases: There is interlobular septal thickening compatible with pulmonary edema.  There are trace bilateral pleural effusions.    Heart: The heart is borderline enlarged.  No pericardial effusion.    Liver: Normal in size and attenuation without focal hepatic lesion.    Biliary tract: No intrahepatic or extrahepatic biliary ductal dilatation.    Gallbladder: There is nonspecific gallbladder wall thickening which may be reactive.  No radiodense gallstone seen.  The gallbladder is not over distended.    Pancreas: Normal. No pancreatic ductal dilatation.    Spleen: Normal size without focal lesion.    Adrenals: Normal.    Kidneys and urinary collecting systems: Normal.  No hydronephrosis or urolithiasis.    Lymph nodes: None enlarged.    Stomach and bowel: The stomach is normal.  Loops of small and large bowel are normal in caliber without evidence for inflammation or obstruction.  The appendix is normal.    Peritoneum and mesentery: There is trace perihepatic ascites.    Vasculature: Normal.    Urinary bladder: Normal.    Reproductive organs: Prostate is normal.    Body wall: No abnormality.    Musculoskeletal: No aggressive osseous lesion.                                      IV access:        Peripheral IV - Single Lumen 05/03/23 0100 Anterior;Distal;Left  Forearm (Active)   Site Assessment Clean;Dry;Intact;No redness;No swelling 05/04/23 1600   Extremity Assessment Distal to IV No warmth;No swelling;No redness;No abnormal discoloration 05/03/23 2030   Line Status Flushed 05/04/23 1600   Dressing Status Clean;Dry;Intact 05/04/23 1600   Dressing Intervention Integrity maintained 05/04/23 1600       Allergies: Review of patient's allergies indicates:  No Known Allergies     NPO: No    Anticoagulation:   Anticoagulants       Ordered     Route Frequency Start Stop    05/03/23 0759  enoxaparin         SubQ Daily 05/03/23 1700 --             Instructions      [facility]  Admit to Hospital Medicine  Upon patient arrival to floor, please contact Hospital Medicine on call.

## 2023-05-05 NOTE — PLAN OF CARE
[facility]  Initial Discharge Assessment       Primary Care Provider: Primary Doctor No    Admission Diagnosis: CHF, acute [I50.9]    Admission Date: 5/4/2023  Expected Discharge Date:     Discharge Barriers Identified: Unisured    Assessment completed at bedside.  Advanced directives not addressed at this time, patient intends to discharge home where he cares for his mother.    Payor: /     Extended Emergency Contact Information  Primary Emergency Contact: Shasha Kovacs  Mobile Phone: [phone]  Relation: Friend    Discharge Plan A: Home  Discharge Plan B: Home with family      [id number] - [facility] [address]  AT [address]  [address] [facility] [address] [facility] [address]  Phone: [phone] Fax: [phone]      Initial Assessment (most recent)       Adult Discharge Assessment - 05/05/23 1113          Discharge Assessment    Assessment Type Discharge Planning Assessment     Confirmed/corrected address, phone number and insurance Yes     Confirmed Demographics Correct on Facesheet     Source of Information patient     When was your last doctors appointment? --   3 days ago    Communicated ANUJA with patient/caregiver Yes     Reason For Admission cardomyopathy     People in Home parent(s)     Facility Arrived From: home     Do you expect to return to your current living situation? Yes     Do you have help at home or someone to help you manage your care at home? No     Prior to hospitilization cognitive status: Alert/Oriented     Current cognitive status: Alert/Oriented     Equipment Currently Used at Home none     Readmission within 30 days? No     Patient currently being followed by outpatient case management? No     Do you currently have service(s) that help you manage your care at home? No     Do you take prescription medications? No     Do you have prescription coverage? No     Do you have any problems affording any of your prescribed medications? TBD     How do  you get to doctors appointments? car, drives self     Are you on dialysis? No     Do you take coumadin? No     Discharge Plan A Home     Discharge Plan B Home with family     DME Needed Upon Discharge  none     Discharge Plan discussed with: Patient     Discharge Barriers Identified Unisured

## 2023-05-05 NOTE — SUBJECTIVE & OBJECTIVE
Past Medical History:   Diagnosis Date    Asthma        History reviewed. No pertinent surgical history.    Review of patient's allergies indicates:  No Known Allergies    Current Facility-Administered Medications on File Prior to Encounter   Medication    [DISCONTINUED] acetaminophen tablet 650 mg    [DISCONTINUED] amLODIPine tablet 10 mg    [DISCONTINUED] carvediloL tablet 12.5 mg    [DISCONTINUED] enoxaparin injection 40 mg    [DISCONTINUED] famotidine tablet 20 mg    [DISCONTINUED] furosemide tablet 20 mg    [DISCONTINUED] hydrALAZINE injection 10 mg    [DISCONTINUED] hydrALAZINE tablet 25 mg    [DISCONTINUED] isosorbide mononitrate 24 hr tablet 60 mg    [DISCONTINUED] lactated ringers infusion    [DISCONTINUED] sodium chloride 0.9% flush 10 mL    [DISCONTINUED] sodium chloride 0.9% flush 10 mL     Current Outpatient Medications on File Prior to Encounter   Medication Sig    aspirin 325 MG tablet Take 325 mg by mouth once daily.     Family History       Problem Relation (Age of Onset)    Diabetes Mother    Heart disease Mother    Hypertension Mother          Tobacco Use    Smoking status: Never    Smokeless tobacco: Never   Substance and Sexual Activity    Alcohol use: Yes     Alcohol/week: 5.0 standard drinks     Types: 5 Drinks containing 0.5 oz of alcohol per week     Comment: about 8 drinks on the weekend    Drug use: Yes     Types: Cocaine     Comment: Pt states occational cocaine use    Sexual activity: Not on file     Review of Systems   Constitutional:  Negative for chills and fever.   HENT:  Negative for congestion.    Respiratory:  Positive for cough and chest tightness.    Gastrointestinal:  Positive for constipation. Negative for nausea and vomiting.   Genitourinary:  Negative for dysuria.   Skin:  Negative for wound.   Allergic/Immunologic: Negative for immunocompromised state.   Neurological:  Negative for headaches.   Psychiatric/Behavioral:  Negative for confusion.    Objective:     Vital Signs  (Most Recent):  Temp: 97.3 °F (36.3 °C) (05/04/23 2100)  Pulse: 95 (05/04/23 2100)  Resp: 18 (05/04/23 2100)  BP: (!) 161/97 (05/04/23 2100)  SpO2: 95 % (05/04/23 2100)   Vital Signs (24h Range):  Temp:  [97.3 °F (36.3 °C)-98.4 °F (36.9 °C)] 97.3 °F (36.3 °C)  Pulse:  [80-97] 95  Resp:  [16-18] 18  SpO2:  [95 %-97 %] 95 %  BP: (135-166)/(80-97) 161/97     Weight: 112.1 kg (247 lb 1.5 oz)  Body mass index is 36.49 kg/m².     Physical Exam  Vitals and nursing note reviewed.   Constitutional:       General: He is not in acute distress.     Appearance: He is obese. He is not ill-appearing, toxic-appearing or diaphoretic.   HENT:      Head: Normocephalic and atraumatic.      Mouth/Throat:      Mouth: Mucous membranes are moist.   Eyes:      General:         Right eye: No discharge.         Left eye: No discharge.      Extraocular Movements: Extraocular movements intact.      Conjunctiva/sclera: Conjunctivae normal.      Pupils: Pupils are equal, round, and reactive to light.   Cardiovascular:      Rate and Rhythm: Normal rate and regular rhythm.      Comments: Warm peripheries, no LE edema  Pulmonary:      Comments: On RA, distant breath sounds, no wheeze or accessory muscle use  Abdominal:      General: Bowel sounds are normal. There is no distension.      Palpations: Abdomen is soft.      Tenderness: There is no abdominal tenderness. There is no guarding.   Skin:     General: Skin is warm and dry.      Comments: +tattoos present   Neurological:      General: No focal deficit present.      Mental Status: He is alert and oriented to person, place, and time. Mental status is at baseline.   Psychiatric:         Mood and Affect: Mood normal.            CRANIAL NERVES     CN III, IV, VI   Pupils are equal, round, and reactive to light.     Significant Labs: Blood Culture: No results for input(s): LABBLOO in the last 48 hours.  BMP:   Recent Labs   Lab 05/04/23  0459   GLU 98      K 4.6      CO2 26   BUN 13    CREATININE 1.7*   CALCIUM 8.9   MG 1.9     CBC:   Recent Labs   Lab 05/02/23 2343 05/04/23 0459   WBC 10.07 10.59   HGB 16.9 15.6   HCT 49.8 46.2    190     CMP:   Recent Labs   Lab 05/02/23 2343 05/04/23 0459   * 139   K 4.7 4.6    105   CO2 23 26   * 98   BUN 13 13   CREATININE 1.5* 1.7*   CALCIUM 8.9 8.9   PROT 6.6 6.0   ALBUMIN 3.6 3.2*   BILITOT 0.8 1.3*   ALKPHOS 63 59   AST 43* 29   ALT 80* 61*   ANIONGAP 12 8     Cardiac Markers:   Recent Labs   Lab 05/02/23 2343   *     Lactic Acid:   Recent Labs   Lab 05/04/23 2128   LACTATE 1.3     Magnesium:   Recent Labs   Lab 05/02/23 2343 05/04/23 0459   MG 1.9 1.9     POCT Glucose: No results for input(s): POCTGLUCOSE in the last 48 hours.  Respiratory Culture: No results for input(s): GSRESP, RESPIRATORYC in the last 48 hours.  Troponin:   Recent Labs   Lab 05/03/23  1346 05/03/23 2005 05/04/23  0956   TROPONINI 0.218* 0.416* 0.263*     TSH:   Recent Labs   Lab 05/02/23 2343   TSH 0.065*     Urine Culture: No results for input(s): LABURIN in the last 48 hours.  Urine Studies:   Recent Labs   Lab 05/03/23  0019 05/04/23  1334   COLORU Yellow  --    APPEARANCEUA Clear  --    PHUR 6.0  --    SPECGRAV 1.015  --    PROTEINUA 3+*  --    GLUCUA Negative  --    KETONESU Negative  --    BILIRUBINUA Negative  --    OCCULTUA Trace*  --    NITRITE Negative  --    UROBILINOGEN Negative  --    LEUKOCYTESUR Negative  --    RBCUA 2 1   WBCUA 0  --    BACTERIA None  --    HYALINECASTS 0  --        Significant Imaging: I have reviewed all pertinent imaging results/findings within the past 24 hours.    X-Ray Chest AP Portable    Result Date: 5/3/2023  EXAMINATION: XR CHEST AP PORTABLE CLINICAL HISTORY: Shortness of breath TECHNIQUE: Portable view of the chest was performed. COMPARISON: None. FINDINGS: Mild diffuse prominence of the pulmonary interstitium most predominant within the perihilar lungs and bilateral lower lobes consistent with  pulmonary edema.  Heart size is enlarged.  Trachea midline. Bony thorax intact.     Congestive heart failure.  Close interval follow-up is recommended. This report was flagged in Epic as abnormal.  The preliminary and final reports are concordant. Electronically signed by: Mello Dan Date:    05/03/2023 Time:    08:07    Echo    Result Date: 5/3/2023  · The left ventricle is normal in size with mild concentric hypertrophy and · Mild tricuspid regurgitation. · Normal right ventricular size with normal right ventricular systolic function. · Normal central venous pressure (3 mmHg). · The estimated PA systolic pressure is 50 mmHg. · There is pulmonary hypertension. · The estimated ejection fraction is 15%. · Grade I left ventricular diastolic dysfunction. · There is severe left ventricular global hypokinesis.      US Abdomen Limited    Result Date: 5/3/2023  EXAMINATION: US ABDOMEN LIMITED CLINICAL HISTORY: abd distention; TECHNIQUE: Limited ultrasound of the right upper quadrant of the abdomen (including pancreas, liver, gallbladder, common bile duct, and spleen) was performed. COMPARISON: None. FINDINGS: Liver: Normal in size, measuring 15.4 cm. Homogeneous echotexture. No focal hepatic lesions. Gallbladder: There is diffuse gallbladder wall thickening up to 6 mm diameter.  There are several lobular intraluminal projections measuring between 3-5 mm without associated vascular flow or shadowing.  No discrete cystic spaces or comet tail artifact.  No Woodson sign.  No pericholecystic fluid. Biliary system: The common duct is mildly dilated, measuring 7 mm.  No intrahepatic ductal dilatation. Spleen: Normal in size and echotexture, measuring 10.9 cm. Miscellaneous: Bilateral pleural fluid.     1. Gallbladder cholesterolosis with multi polyps measuring up to 5 mm.  Recommend general surgery consultation in an elective setting. 2. Bilateral pleural effusions. 3. Mild common duct dilation. Correlate with bilirubin level  as the need for further assessment with ERCP/MRCP. Electronically signed by: Ab Martin Date:    05/03/2023 Time:    08:57    CT Abdomen Pelvis  Without Contrast    Result Date: 5/3/2023  EXAMINATION: CT ABDOMEN PELVIS WITHOUT CONTRAST CLINICAL HISTORY: Abdominal pain, acute, nonlocalized; TECHNIQUE: Multiplanar images were obtained of the abdomen and pelvis from the hemidiaphragms through the symphysis pubis without intravenous contrast. COMPARISON: None FINDINGS: Lung Bases: There is interlobular septal thickening compatible with pulmonary edema.  There are trace bilateral pleural effusions. Heart: The heart is borderline enlarged.  No pericardial effusion. Liver: Normal in size and attenuation without focal hepatic lesion. Biliary tract: No intrahepatic or extrahepatic biliary ductal dilatation. Gallbladder: There is nonspecific gallbladder wall thickening which may be reactive.  No radiodense gallstone seen.  The gallbladder is not over distended. Pancreas: Normal. No pancreatic ductal dilatation. Spleen: Normal size without focal lesion. Adrenals: Normal. Kidneys and urinary collecting systems: Normal.  No hydronephrosis or urolithiasis. Lymph nodes: None enlarged. Stomach and bowel: The stomach is normal.  Loops of small and large bowel are normal in caliber without evidence for inflammation or obstruction.  The appendix is normal. Peritoneum and mesentery: There is trace perihepatic ascites. Vasculature: Normal. Urinary bladder: Normal. Reproductive organs: Prostate is normal. Body wall: No abnormality. Musculoskeletal: No aggressive osseous lesion.     1. Gallbladder wall thickening without radiodense gallstone.  Findings may be reactive.  Recommend further evaluation with right upper quadrant ultrasound. 2. Trace pleural effusions and suspected interstitial pulmonary edema in the lung bases. 3. Trace perihepatic ascites. Electronically signed by: Parish Estrada Date:    05/03/2023 Time:    00:03

## 2023-05-05 NOTE — NURSING
Patient transferred from [facility]. Patient admitted to floor at 21:10.  Patient ambulated over to bed without difficulty. Patient oriented to room, bed in lowest position, wheels locked, bed rails up x2, bed alarm refused, urinal within reach, call light and bedside table within reach. Safety measures addressed. POC reviewed with patient.

## 2023-05-05 NOTE — PLAN OF CARE
05/05/23 0744   Final Note   Assessment Type Final Discharge Note   Anticipated Discharge Disposition Short Term

## 2023-05-05 NOTE — INTERVAL H&P NOTE
The patient has been examined and the H&P has been reviewed:    I concur with the findings and no changes have occurred since H&P was written.    Procedure risks, benefits and alternative options discussed and understood by patient/family.      Discussed risks and benefits of contrast induced nephropathy with the patient.  Patient would like to proceed with the procedure.    Active Hospital Problems    Diagnosis  POA    *Cardiomyopathy [I42.9]  Yes    KRAGI VS CKD [N17.9]  Yes    Troponin I above reference range [R77.8]  Yes    Regular alcohol consumption [Z78.9]  Not Applicable    Acute combined systolic and diastolic congestive heart failure [I50.41]  Yes    Abnormal thyroid function test [R94.6]  Yes    Gallbladder polyp [K82.4]  Yes    Hypertension [I10]  Yes    Obesity (BMI 30.0-34.9) [E66.9]  Yes      Resolved Hospital Problems   No resolved problems to display.

## 2023-05-05 NOTE — NURSING
Discharged via stretcher with EMS. Heplock to L arm intact & patent. No distress noted. Called Drea MEJIA with update.

## 2023-05-05 NOTE — CONSULTS
[facility]  Department of Cardiology  Consult Note      PATIENT NAME: Jordi Moreno  MRN: [id number]  TODAY'S DATE: 05/05/2023  ADMIT DATE: 5/4/2023                          CONSULT REQUESTED BY: Vilma Urrutia MD    SUBJECTIVE     PRINCIPAL PROBLEM: Cardiomyopathy      REASON FOR CONSULT:    CMP, CHF, Transfer from ONS      INTERVAL HISTORY    5/5/23    Plans for Cardiac cath today.  Creatinine slightly worse this a.m. 1.7, was 1.5 yesterday.  Troponin HS this a.m. is 128.  Troponin 1 yesterday was 0.263.  BNP this a.m. 531 was in the 900s on May 2nd.  Patient denies chest pain or any anginal equivalent symptoms at this time.  He is euvolemic.  He was able to ambulate in room without any acute shortness of breath or symptoms.  He has been NPO since midnight.  Sinus rhythm on monitor with no events.  Patient admits currently 8-10 beers approximately 3 times eats.  He denies any recreational drug use or smoking.  He is not checked his blood pressure routinely in approximately 1 year.  He states that he has been able to tolerate exercise and strenuous activity up until last week.    5/4/23    Denies any new symptoms.  Feels dehydrated.  New onset HFrEF, severe cardiomyopathy- unclear etiology ischemic vs ETOH vs tachycardia mediated vs viral vs chronic uncontrolled hypertension.   KRAIG versus CKD- unknown baseline     -echo reviewed.  Severely depressed LV function.   -workup for hyperthyroidism.  (Low TSH and sinus tachycardia)  -creatinine trending up.  Patient appears clinically dry.  We will try gentle IV hydration today and repeat BUN creatinine tomorrow.   -consider nephrology consultation  -right heart catheterization if creatinine is worse tomorrow  -right and left heart catheterization if creatinine is improved tomorrow  -GDMT will be optimized after stabilization of renal function.     HPI:    FROM CARDIOLOGY CONSULT DR. ULLOA 5/3/23  Admission HPI:  Jordi Moreno is a 39-year-old male who  presents emergency room complaining of abdominal pain and distention.  Symptoms onset 3 days ago.  He reports symptoms progressively worsened over the past 8 hours.  Denies any fever or chills.  No known sick contacts or travel.  No aggravating or alleviating factors.  He does endorse that he placed himself on ampicillin for an upper respiratory infection recently? ?  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 ?  He denies any pertinent previous medical history.  He denies any pertinent surgical history.  He was a nonsmoker.  He denies using any alcohol.  ER workup:  CBC unremarkable.  CMP with creatinine of 1.5, AST 43 and ALT of 80.  Troponin mildly elevated 0.096.  Urinalysis with trace occult blood.  CT the abdomen and pelvis demonstrates some gallbladder thickening without identified stone, recommending gallbladder ultrasound.  Patient was found to be hypertensive on arrival emergency room.  He was given hydralazine with some improvement.  Patient will be admitted to Hospital Medicine for treatment management.     Cardiology consult:  Seen and examined patient at bedside.  Patient feels much better.  Denies any history of heart disease in the past.  States he is physically very fit and did extensive hiking last year.  Presented with abdominal pain and recent onset dyspnea.  Denies any chest pain.  States he had  bronchitis about a month ago      Review of patient's allergies indicates:  No Known Allergies    Past Medical History:   Diagnosis Date    Asthma      History reviewed. No pertinent surgical history.  Social History     Tobacco Use    Smoking status: Never    Smokeless tobacco: Never   Substance Use Topics    Alcohol use: Yes     Alcohol/week: 5.0 standard drinks     Types: 5 Drinks containing 0.5 oz of alcohol per week     Comment: about 8 drinks on the weekend    Drug use: Yes     Types: Cocaine     Comment: Pt states occational cocaine use        REVIEW OF  SYSTEMS  CONSTITUTIONAL: Negative for chills, fatigue and fever.   EYES: No double vision, No blurred vision  NEURO: No headaches, No dizziness  RESPIRATORY: Negative for cough, shortness of breath and wheezing.    CARDIOVASCULAR: Negative for chest pain. Negative for palpitations and leg swelling.   GI: Negative for abdominal pain, No melena, diarrhea, nausea and vomiting.   : Negative for dysuria and frequency, Negative for hematuria  SKIN: Negative for bruising, Negative for edema or discoloration noted.   ENDOCRINE: Negative for polyphagia, Negative for heat intolerance, Negative for cold intolerance  PSYCHIATRIC: Negative for depression, Negative for anxiety, Negative for memory loss  MUSCULOSKELETAL: Negative for neck pain, Negative for muscle weakness, Negative for back pain     OBJECTIVE     VITAL SIGNS (Most Recent)  Temp: 98.1 °F (36.7 °C) (05/05/23 0706)  Pulse: 92 (05/05/23 0706)  Resp: 18 (05/05/23 0706)  BP: (!) 147/106 (Notified Sue) (05/05/23 0706)  SpO2: 97 % (05/05/23 0706)    VENTILATION STATUS  Resp: 18 (05/05/23 0706)  SpO2: 97 % (05/05/23 0706)           I & O (Last 24H):  Intake/Output Summary (Last 24 hours) at 5/5/2023 0947  Last data filed at 5/5/2023 0610  Gross per 24 hour   Intake 270 ml   Output 550 ml   Net -280 ml       WEIGHTS  Wt Readings from Last 3 Encounters:   05/04/23 2100 112.1 kg (247 lb 1.5 oz)   05/04/23 0557 112.1 kg (247 lb 2.2 oz)   05/03/23 0735 114.8 kg (253 lb)   05/03/23 0515 114.8 kg (253 lb 1.4 oz)   05/02/23 2323 106.6 kg (235 lb)   05/04/23 1909 112.1 kg (247 lb 2.2 oz)       PHYSICAL EXAM  GENERAL:  Obese well built, well nourished, well-developed in no apparent distress alert and oriented.   HEENT: Normocephalic. Pupils normal and conjunctivae normal.    NECK: No JVD. No bruit..   THYROID: Thyroid not examined  CARDIAC: Regular rate and rhythm. S1 is normal.S2 is normal. No gallops, clicks or murmurs noted at this time.  CHEST ANATOMY: normal.   LUNGS:  Clear to auscultation. No wheezing or rhonchi..   ABDOMEN: Soft. Normal bowel sounds. Nontender  URINARY: No william catheter   EXTREMITIES: No cyanosis, clubbing or edema noted at this time.  PERIPHERAL VASCULAR SYSTEM: Good palpable distal pulses.   CENTRAL NERVOUS SYSTEM: No focal motor or sensory deficits noted.   SKIN: Skin without lesions, moist, well perfused.   MUSCLE STRENGTH & TONE: No noteable weakness, atrophy or abnormal movement.     HOME MEDICATIONS:  Current Facility-Administered Medications on File Prior to Encounter   Medication Dose Route Frequency Provider Last Rate Last Admin    [DISCONTINUED] acetaminophen tablet 650 mg  650 mg Oral Q4H PRN Mello Fregoso NP        [DISCONTINUED] amLODIPine tablet 10 mg  10 mg Oral Daily Dale Bolden MD   10 mg at 05/04/23 0825    [DISCONTINUED] carvediloL tablet 12.5 mg  12.5 mg Oral BID Dale Bolden MD   12.5 mg at 05/04/23 2003    [DISCONTINUED] enoxaparin injection 40 mg  40 mg Subcutaneous Daily Dale Bolden MD   40 mg at 05/04/23 1719    [DISCONTINUED] famotidine tablet 20 mg  20 mg Oral BID PRN Dale Bolden MD   20 mg at 05/03/23 1205    [DISCONTINUED] hydrALAZINE injection 10 mg  10 mg Intravenous Q6H PRN Dale Bolden MD        [DISCONTINUED] hydrALAZINE tablet 25 mg  25 mg Oral Q8H Dale Bolden MD   25 mg at 05/04/23 1431    [DISCONTINUED] isosorbide mononitrate 24 hr tablet 60 mg  60 mg Oral Daily Dale Bolden MD   60 mg at 05/04/23 0825    [DISCONTINUED] lactated ringers infusion   Intravenous Continuous Dale Bolden MD 50 mL/hr at 05/04/23 1254 New Bag at 05/04/23 1254    [DISCONTINUED] sodium chloride 0.9% flush 10 mL  10 mL Intravenous Q8H PRN Mello Fregoso NP        [DISCONTINUED] sodium chloride 0.9% flush 10 mL  10 mL Intravenous PRN Guzman Holbrook MD         Current Outpatient Medications on File Prior to Encounter   Medication Sig Dispense Refill    aspirin 325 MG tablet Take 325 mg by mouth once  daily.         SCHEDULED MEDS:   amLODIPine  10 mg Oral Daily    aspirin  81 mg Oral Daily    carvediloL  12.5 mg Oral BID    enoxaparin  40 mg Subcutaneous Daily    hydrALAZINE  25 mg Oral Q8H    isosorbide mononitrate  60 mg Oral Daily       CONTINUOUS INFUSIONS:   sodium chloride 0.9% 50 mL/hr at 05/04/23 2218       PRN MEDS:acetaminophen, dextrose 50%, dextrose 50%, glucagon (human recombinant), glucose, glucose, magnesium oxide, magnesium oxide, naloxone, ondansetron, potassium bicarbonate, potassium bicarbonate, potassium bicarbonate, potassium, sodium phosphates, potassium, sodium phosphates, potassium, sodium phosphates, senna-docusate 8.6-50 mg, sodium chloride 0.9%    LABS AND DIAGNOSTICS     CBC LAST 3 DAYS  Recent Labs   Lab 05/02/23 2343 05/04/23 0459 05/05/23 0429   WBC 10.07 10.59 8.52   RBC 5.54 5.10 5.14   HGB 16.9 15.6 15.6   HCT 49.8 46.2 47.1   MCV 90 91 92   MCH 30.5 30.6 30.4   MCHC 33.9 33.8 33.1   RDW 15.6* 15.7* 16.0*    190 180   MPV 9.5 9.6 9.6   GRAN 74.5*  7.5 72.7  7.7 65.4  5.6   LYMPH 14.4*  1.5 12.7*  1.4 20.1  1.7   MONO 8.4  0.9 12.4  1.3* 12.0  1.0   BASO 0.03 0.02 0.02   NRBC 0 0 0       COAGULATION LAST 3 DAYS  Recent Labs   Lab 05/05/23 0429   INR 1.1       CHEMISTRY LAST 3 DAYS  Recent Labs   Lab 05/02/23  2343 05/04/23 0459 05/05/23 0429   * 139 139   K 4.7 4.6 4.8    105 104   CO2 23 26 29   ANIONGAP 12 8 6*   BUN 13 13 19   CREATININE 1.5* 1.7* 1.7*   * 98 98   CALCIUM 8.9 8.9 8.7   MG 1.9 1.9 2.1   ALBUMIN 3.6 3.2* 3.3*   PROT 6.6 6.0 6.2   ALKPHOS 63 59 53*   ALT 80* 61* 44   AST 43* 29 18   BILITOT 0.8 1.3* 0.9       CARDIAC PROFILE LAST 3 DAYS  Recent Labs   Lab 05/02/23  2343 05/03/23  0818 05/03/23  1346 05/03/23 2005 05/04/23  0956 05/05/23  0429   *  --   --   --   --  531*     --   --   --   --   --    TROPONINI 0.096*   < > 0.218* 0.416* 0.263*  --    TROPONINIHS  --   --   --   --   --  128.0*    < > =  values in this interval not displayed.       ENDOCRINE LAST 3 DAYS  Recent Labs   Lab 05/02/23  2343   TSH 0.065*       LAST ARTERIAL BLOOD GAS  ABG  No results for input(s): PH, PO2, PCO2, HCO3, BE in the last 168 hours.    LAST 7 DAYS MICROBIOLOGY   Microbiology Results (last 7 days)       ** No results found for the last 168 hours. **            MOST RECENT IMAGING  X-Ray Chest AP Portable  Narrative: EXAMINATION:  XR CHEST AP PORTABLE    CLINICAL HISTORY:  chf;    FINDINGS:  Portable chest at 638 compared with 05/03/2023 shows unchanged slightly enlarged cardiac silhouette size.  Mediastinal contours are normal.  Patient is slightly rotated.    Central pulmonary vascular prominence has not significantly changed.  Perihilar interstitial opacities have improved.  No confluent alveolar consolidation or pleural effusion.  No acute osseous abnormality.  Impression: Interval improvement of interstitial edema with persistent cardiomegaly and mild central pulmonary vascular prominence.    Electronically signed by: Tor Macias MD  Date:    05/05/2023  Time:    07:08      ECHOCARDIOGRAM RESULTS (last 5)  Results for orders placed during the hospital encounter of 05/02/23    Echo    Interpretation Summary  · The left ventricle is normal in size with mild concentric hypertrophy and  · Mild tricuspid regurgitation.  · Normal right ventricular size with normal right ventricular systolic function.  · Normal central venous pressure (3 mmHg).  · The estimated PA systolic pressure is 50 mmHg.  · There is pulmonary hypertension.  · The estimated ejection fraction is 15%.  · Grade I left ventricular diastolic dysfunction.  · There is severe left ventricular global hypokinesis.      CURRENT/PREVIOUS VISIT EKG  Results for orders placed or performed during the hospital encounter of 05/04/23   EKG 12-lead    Collection Time: 05/04/23 10:24 PM    Narrative    Test Reason : I42.9,    Vent. Rate : 093 BPM     Atrial Rate : 093 BPM      P-R Int : 162 ms          QRS Dur : 102 ms      QT Int : 384 ms       P-R-T Axes : 032 -19 021 degrees     QTc Int : 477 ms    Normal sinus rhythm  Nonspecific T wave abnormality  Prolonged QT  Abnormal ECG  When compared with ECG of 02-MAY-2023 23:57,  Questionable change in The axis  Non-specific change in ST segment in Lateral leads    Referred By: ARIS CARABALLO           Confirmed By:            ASSESSMENT/PLAN:     Active Hospital Problems    Diagnosis    *Cardiomyopathy    KRAIG VS CKD    Troponin I above reference range    Regular alcohol consumption    Acute combined systolic and diastolic congestive heart failure    Abnormal thyroid function test    Gallbladder polyp    Hypertension    Obesity (BMI 30.0-34.9)       ASSESSMENT & PLAN:     New onset HFrEF, severe cardiomyopathy- unclear etiology ischemic vs ETOH vs tachycardia mediated vs viral vs chronic uncontrolled hypertension.   KRAIG versus CKD- unknown baseline        RECOMMENDATIONS:    Echocardiogram this admission shows EF of 15% with grade 1 diastolic dysfunction.  Severe left ventricular global hypokinesis.  Hyperthyroidism this admission with low TSH in sinus tachycardia.  We will defer to primary team for management.  Left heart catheterization to be performed today by Dr. Hernandez.  Slightly worsening creatinine this a.m..  Patient may benefit from Nephrology consultation. GDMT will be optimized after stabilization of renal function.   Patient is euvolemic.  Recommend holding further diuresis at this time in view of worsening Cr. Strict I's and O's.  1.5 L fluid restriction.  Patient will likely need Lifevest upon discharge.  Strongly encouraged patient to stop drinking alcohol.  Continue aspirin 81 mg daily.  Continue amlodipine 10 mg daily, carvedilol 12.5 mg b.i.d., hydralazine 25 mg q.8 hours, isosorbide mononitrate 60 mg daily.  We will continue to follow.      Yaima Gaines NP  Department of Cardiology  Date of Service: 05/05/2023

## 2023-05-05 NOTE — NURSING
Nurses Note -- 4 Eyes      5/4/2023   10:53 PM      Skin assessed during: Admit      [x] No Altered Skin Integrity Present    [x]Prevention Measures Documented      [] Yes- Altered Skin Integrity Present or Discovered   [] LDA Added if Not in Epic (Describe Wound)   [] New Altered Skin Integrity was Present on Admit and Documented in LDA   [] Wound Image Taken    Wound Care Consulted? No    Attending Nurse:  Drea Means RN     Second RN/Staff Member:  [phone] Larry MEJIA

## 2023-05-05 NOTE — SUBJECTIVE & OBJECTIVE
Interval History:     Review of Systems  Objective:     Vital Signs (Most Recent):  Temp: 98.5 °F (36.9 °C) (05/05/23 1545)  Pulse: 88 (05/05/23 1815)  Resp: 16 (05/05/23 1815)  BP: 118/71 (05/05/23 1815)  SpO2: 97 % (05/05/23 1815) Vital Signs (24h Range):  Temp:  [97.3 °F (36.3 °C)-98.7 °F (37.1 °C)] 98.5 °F (36.9 °C)  Pulse:  [87-97] 88  Resp:  [16-20] 16  SpO2:  [95 %-99 %] 97 %  BP: (118-166)/() 118/71     Weight: 112.1 kg (247 lb 1.5 oz)  Body mass index is 36.49 kg/m².    Intake/Output Summary (Last 24 hours) at 5/5/2023 1828  Last data filed at 5/5/2023 1325  Gross per 24 hour   Intake 270 ml   Output 1150 ml   Net -880 ml         Physical Exam  Constitutional:       General: He is not in acute distress.     Appearance: He is well-developed.   HENT:      Head: Normocephalic.   Eyes:      Pupils: Pupils are equal, round, and reactive to light.   Cardiovascular:      Rate and Rhythm: Normal rate and regular rhythm.   Pulmonary:      Effort: Pulmonary effort is normal. No respiratory distress.      Breath sounds: Normal breath sounds.   Abdominal:      General: There is no distension.      Tenderness: There is no abdominal tenderness.   Musculoskeletal:      Cervical back: Neck supple.   Skin:     Findings: No rash.   Neurological:      Mental Status: He is alert and oriented to person, place, and time.           Significant Labs: All pertinent labs within the past 24 hours have been reviewed.  CBC:   Recent Labs   Lab 05/04/23 0459 05/05/23 0429   WBC 10.59 8.52   HGB 15.6 15.6   HCT 46.2 47.1    180     CMP:   Recent Labs   Lab 05/04/23 0459 05/05/23 0429    139   K 4.6 4.8    104   CO2 26 29   GLU 98 98   BUN 13 19   CREATININE 1.7* 1.7*   CALCIUM 8.9 8.7   PROT 6.0 6.2   ALBUMIN 3.2* 3.3*   BILITOT 1.3* 0.9   ALKPHOS 59 53*   AST 29 18   ALT 61* 44   ANIONGAP 8 6*     Cardiac Markers:   Recent Labs   Lab 05/05/23  0429   *       Significant Imaging: I have reviewed all  pertinent imaging results/findings within the past 24 hours.

## 2023-05-05 NOTE — CONSULTS
Nephrology Consult Note        Patient Name: Jordi Moreno  MRN: [id number]    Patient Class: IP- Inpatient   Admission Date: 5/4/2023  Length of Stay: 1 days  Date of Service: 5/5/2023    Attending Physician: No att. providers found  Primary Care Provider: Primary Doctor No    Reason for Consult: betzy/htn/chf    SUBJECTIVE:     HPI: 47M presented as a direct admission from [facility] for interventional cardiology evaluation for consideration of cardiac catheterization. Patient initially presented to outside hospital on 5/2 with 3 day history of abdominal distention and bloating, decreased oral intake, dark urine, constipation. Noted to have significantly elevated blood pressure (221/135), labs with creatinine 1.5 (unclear baseline), , high sensitivity troponin 0.096 with subsequently peaked at 0.416, TSH low at 0.065 with normal free T4, UDS negative, ethanol negative, CT abdomen and pelvis with concern for pulmonary edema, gallbladder polyp. He was initially started on IV Lasix, cardiology was consulted. Subsequent echocardiogram with EF of 15% with grade 1 diastolic dysfunction with PASP 50 was seen. Patient denies any known past medical conditions, no significant past surgical history. Does admit to regular alcohol consumption. Blood pressure medications were initiated at outside hospital, seen by Cardiology and plan for RHC versus RHC/LHC.      Past Medical History:   Diagnosis Date    Asthma      History reviewed. No pertinent surgical history.  Family History   Problem Relation Age of Onset    Hypertension Mother     Heart disease Mother     Diabetes Mother      Social History     Tobacco Use    Smoking status: Never    Smokeless tobacco: Never   Substance Use Topics    Alcohol use: Yes     Alcohol/week: 5.0 standard drinks     Types: 5 Drinks containing 0.5 oz of alcohol per week     Comment: about 8 drinks on the weekend    Drug use: Yes     Types: Cocaine     Comment: Pt states occational cocaine use        Review of patient's allergies indicates:  No Known Allergies    Outpatient meds:  Current Facility-Administered Medications on File Prior to Encounter   Medication Dose Route Frequency Provider Last Rate Last Admin    [DISCONTINUED] acetaminophen tablet 650 mg  650 mg Oral Q4H PRN Mello Fregoso NP        [DISCONTINUED] amLODIPine tablet 10 mg  10 mg Oral Daily Dale Bolden MD   10 mg at 05/04/23 0825    [DISCONTINUED] carvediloL tablet 12.5 mg  12.5 mg Oral BID Dale Bolden MD   12.5 mg at 05/04/23 2003    [DISCONTINUED] enoxaparin injection 40 mg  40 mg Subcutaneous Daily Dale Bolden MD   40 mg at 05/04/23 1719    [DISCONTINUED] famotidine tablet 20 mg  20 mg Oral BID PRN Dale Bolden MD   20 mg at 05/03/23 1205    [DISCONTINUED] furosemide tablet 20 mg  20 mg Oral BID Dale Bolden MD   20 mg at 05/04/23 0825    [DISCONTINUED] hydrALAZINE injection 10 mg  10 mg Intravenous Q6H PRN Dale Bolden MD        [DISCONTINUED] hydrALAZINE tablet 25 mg  25 mg Oral Q8H Dale Bolden MD   25 mg at 05/04/23 1431    [DISCONTINUED] isosorbide mononitrate 24 hr tablet 60 mg  60 mg Oral Daily Dale Bolden MD   60 mg at 05/04/23 0825    [DISCONTINUED] lactated ringers infusion   Intravenous Continuous Dale Bolden MD 50 mL/hr at 05/04/23 1254 New Bag at 05/04/23 1254    [DISCONTINUED] sodium chloride 0.9% flush 10 mL  10 mL Intravenous Q8H PRN Mello Fregoso NP        [DISCONTINUED] sodium chloride 0.9% flush 10 mL  10 mL Intravenous PRN Guzman Holbrook MD         Current Outpatient Medications on File Prior to Encounter   Medication Sig Dispense Refill    aspirin 325 MG tablet Take 325 mg by mouth once daily.         Scheduled meds:   amLODIPine  10 mg Oral Daily    aspirin  81 mg Oral Daily    carvediloL  12.5 mg Oral BID    enoxaparin  40 mg Subcutaneous Daily    hydrALAZINE  25 mg Oral Q8H    isosorbide mononitrate  60 mg Oral Daily       Infusions:   sodium chloride 0.9% 50  mL/hr at 05/04/23 4408       PRN meds:  acetaminophen, dextrose 50%, dextrose 50%, glucagon (human recombinant), glucose, glucose, magnesium oxide, magnesium oxide, naloxone, ondansetron, potassium bicarbonate, potassium bicarbonate, potassium bicarbonate, potassium, sodium phosphates, potassium, sodium phosphates, potassium, sodium phosphates, senna-docusate 8.6-50 mg, sodium chloride 0.9%    Review of Systems:  Constitutional:  Negative for chills, fever, malaise/fatigue and weight loss.   HENT:  Negative for hearing loss and nosebleeds.    Eyes:  Negative for blurred vision, double vision and photophobia.   Respiratory:  Negative for cough, shortness of breath and wheezing.    Cardiovascular:  Negative for chest pain, palpitations and leg swelling.   Gastrointestinal:  Negative for abdominal pain, constipation, diarrhea, heartburn, nausea and vomiting.   Genitourinary:  Negative for dysuria, frequency and urgency.   Musculoskeletal:  Negative for falls, joint pain and myalgias.   Skin:  Negative for itching and rash.   Neurological:  Negative for dizziness, speech change, focal weakness, loss of consciousness and headaches.   Endo/Heme/Allergies:  Does not bruise/bleed easily.   Psychiatric/Behavioral:  Negative for depression and substance abuse. The patient is not nervous/anxious.      OBJECTIVE:     Vital Signs and IO:  Temp:  [97.3 °F (36.3 °C)-98.7 °F (37.1 °C)]   Pulse:  [89-97]   Resp:  [16-20]   BP: (135-166)/()   SpO2:  [95 %-99 %]   I/O last 3 completed shifts:  In: 270 [P.O.:270]  Out: 550 [Urine:550]  Wt Readings from Last 5 Encounters:   05/04/23 112.1 kg (247 lb 1.5 oz)   05/04/23 112.1 kg (247 lb 2.2 oz)   05/04/23 112.1 kg (247 lb 2.2 oz)     Body mass index is 36.49 kg/m².    Physical Exam  Constitutional:       General: Patient is not in acute distress.     Appearance: Patient is well-developed. She is not diaphoretic.   HENT:      Head: Normocephalic and atraumatic.      Mouth/Throat:  Mucous membranes are moist.   Eyes:      General: No scleral icterus.     Pupils: Pupils are equal, round, and reactive to light.   Cardiovascular:      Rate and Rhythm: Normal rate and regular rhythm.   Pulmonary:      Effort: Pulmonary effort is normal. No respiratory distress.      Breath sounds: No stridor.   Abdominal:      General: There is no distension.      Palpations: Abdomen is soft.   Musculoskeletal:         General: No deformity. Normal range of motion.      Cervical back: Neck supple.   Skin:     General: Skin is warm and dry.      Findings: No rash present. No erythema.   Neurological:      Mental Status: Patient is alert and oriented to person, place, and time.      Cranial Nerves: No cranial nerve deficit.   Psychiatric:         Behavior: Behavior normal.     Laboratory:  Recent Labs   Lab 05/02/23 2343 05/04/23 0459 05/05/23 0429   * 139 139   K 4.7 4.6 4.8    105 104   CO2 23 26 29   BUN 13 13 19   CREATININE 1.5* 1.7* 1.7*   * 98 98       Recent Labs   Lab 05/02/23 2343 05/04/23 0459 05/05/23 0429   CALCIUM 8.9 8.9 8.7   ALBUMIN 3.6 3.2* 3.3*   MG 1.9 1.9 2.1             No results for input(s): POCTGLUCOSE in the last 168 hours.    Recent Labs   Lab 05/04/23 0459   Hemoglobin A1C 4.6       Recent Labs   Lab 05/02/23 2343 05/04/23 0459 05/05/23 0429   WBC 10.07 10.59 8.52   HGB 16.9 15.6 15.6   HCT 49.8 46.2 47.1    190 180   MCV 90 91 92   MCHC 33.9 33.8 33.1   MONO 8.4  0.9 12.4  1.3* 12.0  1.0       Recent Labs   Lab 05/02/23 2343 05/04/23 0459 05/05/23 0429   BILITOT 0.8 1.3* 0.9   PROT 6.6 6.0 6.2   ALBUMIN 3.6 3.2* 3.3*   ALKPHOS 63 59 53*   ALT 80* 61* 44   AST 43* 29 18       Recent Labs   Lab 05/03/23  0019 05/04/23  1334   Color, UA Yellow  --    Appearance, UA Clear  --    pH, UA 6.0  --    Specific [place], UA 1.015  --    Protein, UA 3+ A  --    Glucose, UA Negative  --    Ketones, UA Negative  --    Urobilinogen, UA Negative  --     Bilirubin (UA) Negative  --    Occult Blood UA Trace A  --    Nitrite, UA Negative  --    RBC, UA 2 1   WBC, UA 0  --    Bacteria None  --    Hyaline Casts, UA 0  --              Microbiology Results (last 7 days)       ** No results found for the last 168 hours. **            ASSESSMENT/PLAN:     KRAIG due to CRS and/or CKD stage 3  Proteinuria w/out hematuria  HFrEF  HTN  Alcohol abuse  No NSAIDs, ACEI/ARB, IV contrast or other nephrotoxins.  Keep MAP > 60, SBP > 100.  Dose meds for GFR < 60 ml/min.  Renal diet - low K, low phos.  BP seem better controlled. Tolerate asymptomatic HTN up to -160.Low sodium diet.    Patient has AKIand possibly CKD stage 3 given proteinuria. Any contrast exposure will always be associated with elevated risk of KRAIG and/or dialysis - either short-term or long term. The potential risk can be diminished (but not entirely eliminated) by measures, incl avoidance of emergent procedures, limiting total single dose of contrast, spacing contrast exposures in time, pre-and post exposure hydration with NS or bicarb drip, holding diuretics, ACEi, NSAIDs and other nephrotoxins pre and post procedure.    Thank you for allowing us to participate in the care of your patient!   We will follow the patient and provide recommendations as needed.    Patient care time was spent personally by me on the following activities:     Obtaining a history.  Examination of patient.  Providing medical care at the patients bedside.  Developing a treatment plan with patient or surrogate and bedside caregivers.  Ordering and reviewing laboratory studies, radiographic studies, pulse oximetry.  Ordering and performing treatments and interventions.  Evaluation of patient's response to treatment.  Discussions with consultants while on the unit and immediately available to the patient.  Re-evaluation of the patient's condition.  Documentation in the medical record.     Edin Patricia MD    [facility]  [address] [place]  [place] [address]    [phone] - tel  [phone] - fax    5/5/2023

## 2023-05-06 VITALS
HEART RATE: 83 BPM | DIASTOLIC BLOOD PRESSURE: 82 MMHG | BODY MASS INDEX: 36.59 KG/M2 | OXYGEN SATURATION: 98 % | SYSTOLIC BLOOD PRESSURE: 131 MMHG | HEIGHT: 69 IN | WEIGHT: 247.06 LBS | RESPIRATION RATE: 18 BRPM | TEMPERATURE: 98 F

## 2023-05-06 LAB
ALBUMIN SERPL BCP-MCNC: 3.4 G/DL (ref 3.5–5.2)
ALP SERPL-CCNC: 51 U/L (ref 55–135)
ALT SERPL W/O P-5'-P-CCNC: 57 U/L (ref 10–44)
ANION GAP SERPL CALC-SCNC: 5 MMOL/L (ref 8–16)
AST SERPL-CCNC: 28 U/L (ref 10–40)
BILIRUB SERPL-MCNC: 0.9 MG/DL (ref 0.1–1)
BUN SERPL-MCNC: 19 MG/DL (ref 6–20)
CALCIUM SERPL-MCNC: 8.7 MG/DL (ref 8.7–10.5)
CHLORIDE SERPL-SCNC: 104 MMOL/L (ref 95–110)
CO2 SERPL-SCNC: 28 MMOL/L (ref 23–29)
CREAT SERPL-MCNC: 1.4 MG/DL (ref 0.5–1.4)
ERYTHROCYTE [DISTWIDTH] IN BLOOD BY AUTOMATED COUNT: 16 % (ref 11.5–14.5)
EST. GFR  (NO RACE VARIABLE): >60 ML/MIN/1.73 M^2
GLUCOSE SERPL-MCNC: 98 MG/DL (ref 70–110)
HCT VFR BLD AUTO: 48.1 % (ref 40–54)
HGB BLD-MCNC: 15.8 G/DL (ref 14–18)
MCH RBC QN AUTO: 30.4 PG (ref 27–31)
MCHC RBC AUTO-ENTMCNC: 32.8 G/DL (ref 32–36)
MCV RBC AUTO: 93 FL (ref 82–98)
PLATELET # BLD AUTO: 204 K/UL (ref 150–450)
PMV BLD AUTO: 9.8 FL (ref 9.2–12.9)
POTASSIUM SERPL-SCNC: 4.5 MMOL/L (ref 3.5–5.1)
PROT SERPL-MCNC: 6.4 G/DL (ref 6–8.4)
RBC # BLD AUTO: 5.2 M/UL (ref 4.6–6.2)
SODIUM SERPL-SCNC: 137 MMOL/L (ref 136–145)
T3FREE SERPL-MCNC: 3.4 PG/ML (ref 2–4.4)
WBC # BLD AUTO: 6.22 K/UL (ref 3.9–12.7)

## 2023-05-06 PROCEDURE — 36415 COLL VENOUS BLD VENIPUNCTURE: CPT | Performed by: INTERNAL MEDICINE

## 2023-05-06 PROCEDURE — 25000003 PHARM REV CODE 250: Performed by: INTERNAL MEDICINE

## 2023-05-06 PROCEDURE — 99233 SBSQ HOSP IP/OBS HIGH 50: CPT | Mod: ,,, | Performed by: INTERNAL MEDICINE

## 2023-05-06 PROCEDURE — 99233 PR SUBSEQUENT HOSPITAL CARE,LEVL III: ICD-10-PCS | Mod: ,,, | Performed by: INTERNAL MEDICINE

## 2023-05-06 PROCEDURE — 85027 COMPLETE CBC AUTOMATED: CPT | Performed by: INTERNAL MEDICINE

## 2023-05-06 PROCEDURE — 25000003 PHARM REV CODE 250: Performed by: NURSE PRACTITIONER

## 2023-05-06 PROCEDURE — 80053 COMPREHEN METABOLIC PANEL: CPT | Performed by: INTERNAL MEDICINE

## 2023-05-06 RX ORDER — FUROSEMIDE 20 MG/1
20 TABLET ORAL DAILY
Qty: 90 TABLET | Refills: 0 | Status: SHIPPED | OUTPATIENT
Start: 2023-05-07 | End: 2023-05-22 | Stop reason: SDUPTHER

## 2023-05-06 RX ORDER — ISOSORBIDE MONONITRATE 60 MG/1
60 TABLET, EXTENDED RELEASE ORAL DAILY
Qty: 90 TABLET | Refills: 0 | Status: SHIPPED | OUTPATIENT
Start: 2023-05-07 | End: 2023-05-22 | Stop reason: SDUPTHER

## 2023-05-06 RX ORDER — FUROSEMIDE 20 MG/1
20 TABLET ORAL DAILY
Status: DISCONTINUED | OUTPATIENT
Start: 2023-05-06 | End: 2023-05-06 | Stop reason: HOSPADM

## 2023-05-06 RX ORDER — HYDRALAZINE HYDROCHLORIDE 50 MG/1
50 TABLET, FILM COATED ORAL EVERY 8 HOURS
Qty: 200 TABLET | Refills: 0 | Status: SHIPPED | OUTPATIENT
Start: 2023-05-06 | End: 2023-05-22 | Stop reason: SDUPTHER

## 2023-05-06 RX ORDER — CARVEDILOL 6.25 MG/1
6.25 TABLET ORAL 2 TIMES DAILY
Qty: 120 TABLET | Refills: 0 | Status: SHIPPED | OUTPATIENT
Start: 2023-05-06 | End: 2023-05-22 | Stop reason: SDUPTHER

## 2023-05-06 RX ORDER — LOSARTAN POTASSIUM 50 MG/1
50 TABLET ORAL DAILY
Qty: 90 TABLET | Refills: 0 | Status: SHIPPED | OUTPATIENT
Start: 2023-05-07 | End: 2023-05-22 | Stop reason: SDUPTHER

## 2023-05-06 RX ORDER — LOSARTAN POTASSIUM 50 MG/1
50 TABLET ORAL DAILY
Status: DISCONTINUED | OUTPATIENT
Start: 2023-05-06 | End: 2023-05-06 | Stop reason: HOSPADM

## 2023-05-06 RX ORDER — AMLODIPINE BESYLATE 10 MG/1
10 TABLET ORAL DAILY
Qty: 90 TABLET | Refills: 0 | Status: SHIPPED | OUTPATIENT
Start: 2023-05-07 | End: 2023-05-22 | Stop reason: SDUPTHER

## 2023-05-06 RX ADMIN — FUROSEMIDE 20 MG: 20 TABLET ORAL at 11:05

## 2023-05-06 RX ADMIN — AMLODIPINE BESYLATE 10 MG: 5 TABLET ORAL at 08:05

## 2023-05-06 RX ADMIN — Medication 9 MG: at 12:05

## 2023-05-06 RX ADMIN — CARVEDILOL 6.25 MG: 6.25 TABLET, FILM COATED ORAL at 08:05

## 2023-05-06 RX ADMIN — HYDRALAZINE HYDROCHLORIDE 50 MG: 25 TABLET, FILM COATED ORAL at 06:05

## 2023-05-06 RX ADMIN — LOSARTAN POTASSIUM 50 MG: 50 TABLET, FILM COATED ORAL at 11:05

## 2023-05-06 RX ADMIN — ISOSORBIDE MONONITRATE 60 MG: 60 TABLET, EXTENDED RELEASE ORAL at 08:05

## 2023-05-06 NOTE — NURSING
Discharge paperwork reviewed and discussed with patient. PIV x1 removed, intact. Tele removed. VSS. No issues/complaints. Pt to follow up with Cardiology outpatient. Pt ambulated, escorted to personal ride home.

## 2023-05-06 NOTE — PLAN OF CARE
05/06/23 1242   Final Note   Assessment Type Final Discharge Note   Anticipated Discharge Disposition Home   What phone number can be called within the next 1-3 days to see how you are doing after discharge? [phone]   Post-Acute Status   Discharge Delays None known at this time     Patient cleared for discharge from case management standpoint.    Follow up appointments scheduled and added to AVS.    Chart and discharge orders reviewed.  Patient discharged home with no further case management needs.       Continue: Refresh Tears (carboxymethylcellulose sodium): drops: 0.5%

## 2023-05-06 NOTE — PROGRESS NOTES
Nephrology Consult Note        Patient Name: Jordi Moreno  MRN: [id number]    Patient Class: IP- Inpatient   Admission Date: 5/4/2023  Length of Stay: 2 days  Date of Service: 5/6/2023    Attending Physician: Daniel Riojas MD  Primary Care Provider: Primary Doctor No    Reason for Consult: betzy/htn/chf    SUBJECTIVE:     HPI: 47M presented as a direct admission from [facility] for interventional cardiology evaluation for consideration of cardiac catheterization. Patient initially presented to outside hospital on 5/2 with 3 day history of abdominal distention and bloating, decreased oral intake, dark urine, constipation. Noted to have significantly elevated blood pressure (221/135), labs with creatinine 1.5 (unclear baseline), , high sensitivity troponin 0.096 with subsequently peaked at 0.416, TSH low at 0.065 with normal free T4, UDS negative, ethanol negative, CT abdomen and pelvis with concern for pulmonary edema, gallbladder polyp. He was initially started on IV Lasix, cardiology was consulted. Subsequent echocardiogram with EF of 15% with grade 1 diastolic dysfunction with PASP 50 was seen. Patient denies any known past medical conditions, no significant past surgical history. Does admit to regular alcohol consumption. Blood pressure medications were initiated at outside hospital, seen by Cardiology and plan for RHC versus RHC/LHC.      5/6 VSS. OK to dc from [facility] and follow as outpatient.    Past Medical History:   Diagnosis Date    Asthma      History reviewed. No pertinent surgical history.  Family History   Problem Relation Age of Onset    Hypertension Mother     Heart disease Mother     Diabetes Mother      Social History     Tobacco Use    Smoking status: Never    Smokeless tobacco: Never   Substance Use Topics    Alcohol use: Yes     Alcohol/week: 5.0 standard drinks     Types: 5 Drinks containing 0.5 oz of alcohol per week     Comment: about 8 drinks on the weekend    Drug use: Yes     Types:  Cocaine     Comment: Pt states occational cocaine use       Review of patient's allergies indicates:  No Known Allergies    Outpatient meds:  No current facility-administered medications on file prior to encounter.     Current Outpatient Medications on File Prior to Encounter   Medication Sig Dispense Refill    aspirin 325 MG tablet Take 325 mg by mouth once daily.         Scheduled meds:   amLODIPine  10 mg Oral Daily    carvediloL  6.25 mg Oral BID    enoxaparin  40 mg Subcutaneous Daily    hydrALAZINE  50 mg Oral Q8H    isosorbide mononitrate  60 mg Oral Daily       Infusions:        PRN meds:  acetaminophen, dextrose 50%, dextrose 50%, glucagon (human recombinant), glucose, glucose, magnesium oxide, magnesium oxide, melatonin, naloxone, ondansetron, potassium bicarbonate, potassium bicarbonate, potassium bicarbonate, potassium, sodium phosphates, potassium, sodium phosphates, potassium, sodium phosphates, senna-docusate 8.6-50 mg, sodium chloride 0.9%    Review of Systems:    OBJECTIVE:     Vital Signs and IO:  Temp:  [97.3 °F (36.3 °C)-98.5 °F (36.9 °C)]   Pulse:  [81-92]   Resp:  [16-19]   BP: (114-148)/()   SpO2:  [95 %-99 %]   I/O last 3 completed shifts:  In: 810 [P.O.:810]  Out: 1550 [Urine:1550]  Wt Readings from Last 5 Encounters:   05/04/23 112.1 kg (247 lb 1.5 oz)   05/04/23 112.1 kg (247 lb 2.2 oz)   05/04/23 112.1 kg (247 lb 2.2 oz)     Body mass index is 36.49 kg/m².    Physical Exam  Constitutional:       General: Patient is not in acute distress.     Appearance: Patient is well-developed. She is not diaphoretic.   HENT:      Head: Normocephalic and atraumatic.      Mouth/Throat: Mucous membranes are moist.   Eyes:      General: No scleral icterus.     Pupils: Pupils are equal, round, and reactive to light.   Cardiovascular:      Rate and Rhythm: Normal rate and regular rhythm.   Pulmonary:      Effort: Pulmonary effort is normal. No respiratory distress.      Breath sounds: No stridor.    Abdominal:      General: There is no distension.      Palpations: Abdomen is soft.   Musculoskeletal:         General: No deformity. Normal range of motion.      Cervical back: Neck supple.   Skin:     General: Skin is warm and dry.      Findings: No rash present. No erythema.   Neurological:      Mental Status: Patient is alert and oriented to person, place, and time.      Cranial Nerves: No cranial nerve deficit.   Psychiatric:         Behavior: Behavior normal.     Laboratory:  Recent Labs   Lab 05/04/23 0459 05/05/23 0429 05/06/23  0734    139 137   K 4.6 4.8 4.5    104 104   CO2 26 29 28   BUN 13 19 19   CREATININE 1.7* 1.7* 1.4   GLU 98 98 98         Recent Labs   Lab 05/02/23  2343 05/04/23 0459 05/05/23 0429 05/06/23  0734   CALCIUM 8.9 8.9 8.7 8.7   ALBUMIN 3.6 3.2* 3.3* 3.4*   MG 1.9 1.9 2.1  --                No results for input(s): POCTGLUCOSE in the last 168 hours.    Recent Labs   Lab 05/04/23 0459   Hemoglobin A1C 4.6         Recent Labs   Lab 05/04/23 0459 05/05/23 0429 05/06/23  0734   WBC 10.59 8.52 6.22   HGB 15.6 15.6 15.8   HCT 46.2 47.1 48.1    180 204   MCV 91 92 93   MCHC 33.8 33.1 32.8   MONO 12.4  1.3* 12.0  1.0  --          Recent Labs   Lab 05/04/23 0459 05/05/23 0429 05/06/23  0734   BILITOT 1.3* 0.9 0.9   PROT 6.0 6.2 6.4   ALBUMIN 3.2* 3.3* 3.4*   ALKPHOS 59 53* 51*   ALT 61* 44 57*   AST 29 18 28         Recent Labs   Lab 05/03/23  0019 05/04/23  1334   Color, UA Yellow  --    Appearance, UA Clear  --    pH, UA 6.0  --    Specific [place], UA 1.015  --    Protein, UA 3+ A  --    Glucose, UA Negative  --    Ketones, UA Negative  --    Urobilinogen, UA Negative  --    Bilirubin (UA) Negative  --    Occult Blood UA Trace A  --    Nitrite, UA Negative  --    RBC, UA 2 1   WBC, UA 0  --    Bacteria None  --    Hyaline Casts, UA 0  --                Microbiology Results (last 7 days)       ** No results found for the last 168 hours. **             ASSESSMENT/PLAN:     CKD stage 3  Proteinuria w/out hematuria  HFrEF  HTN  Alcohol abuse  No NSAIDs, ACEI/ARB, IV contrast or other nephrotoxins.  Keep MAP > 60, SBP > 100.  Dose meds for GFR < 60 ml/min.  Renal diet - low K, low phos.  BP seem better controlled. Tolerate asymptomatic HTN up to -160. Low sodium diet.    Thank you for allowing us to participate in the care of your patient!   We will follow the patient and provide recommendations as needed.    Patient care time was spent personally by me on the following activities:     Obtaining a history.  Examination of patient.  Providing medical care at the patients bedside.  Developing a treatment plan with patient or surrogate and bedside caregivers.  Ordering and reviewing laboratory studies, radiographic studies, pulse oximetry.  Ordering and performing treatments and interventions.  Evaluation of patient's response to treatment.  Discussions with consultants while on the unit and immediately available to the patient.  Re-evaluation of the patient's condition.  Documentation in the medical record.     Edin Patricia MD    [facility]  [address] [place] [id number]    [phone] - tel  [phone] - fax    5/6/2023

## 2023-05-06 NOTE — PROGRESS NOTES
[facility]  Department of Cardiology  Progress Note      PATIENT NAME: Jordi Moreno  MRN: [id number]  TODAY'S DATE: 05/06/2023  ADMIT DATE: 5/4/2023                          CONSULT REQUESTED BY: Daniel Riojas MD    SUBJECTIVE     PRINCIPAL PROBLEM: Cardiomyopathy    5/6/23:  Patient seen up in room walking around. His renal function is improved today. No distress noted.       REASON FOR CONSULT:    CMP, CHF, Transfer from ONS      INTERVAL HISTORY    5/5/23    Plans for Cardiac cath today.  Creatinine slightly worse this a.m. 1.7, was 1.5 yesterday.  Troponin HS this a.m. is 128.  Troponin 1 yesterday was 0.263.  BNP this a.m. 531 was in the 900s on May 2nd.  Patient denies chest pain or any anginal equivalent symptoms at this time.  He is euvolemic.  He was able to ambulate in room without any acute shortness of breath or symptoms.  He has been NPO since midnight.  Sinus rhythm on monitor with no events.  Patient admits currently 8-10 beers approximately 3 times eats.  He denies any recreational drug use or smoking.  He is not checked his blood pressure routinely in approximately 1 year.  He states that he has been able to tolerate exercise and strenuous activity up until last week.    5/4/23    Denies any new symptoms.  Feels dehydrated.  New onset HFrEF, severe cardiomyopathy- unclear etiology ischemic vs ETOH vs tachycardia mediated vs viral vs chronic uncontrolled hypertension.   KRAIG versus CKD- unknown baseline     -echo reviewed.  Severely depressed LV function.   -workup for hyperthyroidism.  (Low TSH and sinus tachycardia)  -creatinine trending up.  Patient appears clinically dry.  We will try gentle IV hydration today and repeat BUN creatinine tomorrow.   -consider nephrology consultation  -right heart catheterization if creatinine is worse tomorrow  -right and left heart catheterization if creatinine is improved tomorrow  -GDMT will be optimized after stabilization of renal function.      HPI:    FROM CARDIOLOGY CONSULT DR. ULLOA 5/3/23  Admission HPI:  Jordi Moreno is a 39-year-old male who presents emergency room complaining of abdominal pain and distention.  Symptoms onset 3 days ago.  He reports symptoms progressively worsened over the past 8 hours.  Denies any fever or chills.  No known sick contacts or travel.  No aggravating or alleviating factors.  He does endorse that he placed himself on ampicillin for an upper respiratory infection recently? ?  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 ?  He denies any pertinent previous medical history.  He denies any pertinent surgical history.  He was a nonsmoker.  He denies using any alcohol.  ER workup:  CBC unremarkable.  CMP with creatinine of 1.5, AST 43 and ALT of 80.  Troponin mildly elevated 0.096.  Urinalysis with trace occult blood.  CT the abdomen and pelvis demonstrates some gallbladder thickening without identified stone, recommending gallbladder ultrasound.  Patient was found to be hypertensive on arrival emergency room.  He was given hydralazine with some improvement.  Patient will be admitted to Hospital Medicine for treatment management.     Cardiology consult:  Seen and examined patient at bedside.  Patient feels much better.  Denies any history of heart disease in the past.  States he is physically very fit and did extensive hiking last year.  Presented with abdominal pain and recent onset dyspnea.  Denies any chest pain.  States he had  bronchitis about a month ago      Review of patient's allergies indicates:  No Known Allergies    Past Medical History:   Diagnosis Date    Asthma      History reviewed. No pertinent surgical history.  Social History     Tobacco Use    Smoking status: Never    Smokeless tobacco: Never   Substance Use Topics    Alcohol use: Yes     Alcohol/week: 5.0 standard drinks     Types: 5 Drinks containing 0.5 oz of alcohol per week     Comment: about 8 drinks on the weekend     Drug use: Yes     Types: Cocaine     Comment: Pt states occational cocaine use        REVIEW OF SYSTEMS  CONSTITUTIONAL: Negative for chills, fatigue and fever.   EYES: No double vision, No blurred vision  NEURO: No headaches, No dizziness  RESPIRATORY: Negative for cough, shortness of breath and wheezing.    CARDIOVASCULAR: Negative for chest pain. Negative for palpitations and leg swelling.   GI: Negative for abdominal pain, No melena, diarrhea, nausea and vomiting.   : Negative for dysuria and frequency, Negative for hematuria  SKIN: Negative for bruising, Negative for edema or discoloration noted.   ENDOCRINE: Negative for polyphagia, Negative for heat intolerance, Negative for cold intolerance  PSYCHIATRIC: Negative for depression, Negative for anxiety, Negative for memory loss  MUSCULOSKELETAL: Negative for neck pain, Negative for muscle weakness, Negative for back pain     OBJECTIVE     VITAL SIGNS (Most Recent)  Temp: 97.5 °F (36.4 °C) (05/06/23 1145)  Pulse: 83 (05/06/23 1145)  Resp: 18 (05/06/23 1145)  BP: 131/82 (05/06/23 1145)  SpO2: 98 % (05/06/23 1145)    VENTILATION STATUS  Resp: 18 (05/06/23 1145)  SpO2: 98 % (05/06/23 1145)           I & O (Last 24H):  Intake/Output Summary (Last 24 hours) at 5/6/2023 1321  Last data filed at 5/6/2023 0916  Gross per 24 hour   Intake 660 ml   Output 1000 ml   Net -340 ml         WEIGHTS  Wt Readings from Last 3 Encounters:   05/04/23 2100 112.1 kg (247 lb 1.5 oz)   05/04/23 0557 112.1 kg (247 lb 2.2 oz)   05/03/23 0735 114.8 kg (253 lb)   05/03/23 0515 114.8 kg (253 lb 1.4 oz)   05/02/23 2323 106.6 kg (235 lb)   05/04/23 1909 112.1 kg (247 lb 2.2 oz)       PHYSICAL EXAM  GENERAL:  Obese well built, well nourished, well-developed in no apparent distress alert and oriented.   HEENT: Normocephalic. Pupils normal and conjunctivae normal.    NECK: No JVD. No bruit..   THYROID: Thyroid not examined  CARDIAC: Regular rate and rhythm. S1 is normal.S2 is normal. No  gallops, clicks or murmurs noted at this time.  CHEST ANATOMY: normal.   LUNGS: Clear to auscultation. No wheezing or rhonchi..   ABDOMEN: Soft. Normal bowel sounds. Nontender  URINARY: No william catheter   EXTREMITIES: No cyanosis, clubbing or edema noted at this time.  PERIPHERAL VASCULAR SYSTEM: Good palpable distal pulses.   CENTRAL NERVOUS SYSTEM: No focal motor or sensory deficits noted.   SKIN: Skin without lesions, moist, well perfused.   MUSCLE STRENGTH & TONE: No noteable weakness, atrophy or abnormal movement.     HOME MEDICATIONS:  No current facility-administered medications on file prior to encounter.     Current Outpatient Medications on File Prior to Encounter   Medication Sig Dispense Refill    aspirin 325 MG tablet Take 325 mg by mouth once daily.         SCHEDULED MEDS:   amLODIPine  10 mg Oral Daily    carvediloL  6.25 mg Oral BID    enoxaparin  40 mg Subcutaneous Daily    furosemide  20 mg Oral Daily    hydrALAZINE  50 mg Oral Q8H    isosorbide mononitrate  60 mg Oral Daily    losartan  50 mg Oral Daily       CONTINUOUS INFUSIONS:        PRN MEDS:acetaminophen, dextrose 50%, dextrose 50%, glucagon (human recombinant), glucose, glucose, magnesium oxide, magnesium oxide, melatonin, naloxone, ondansetron, potassium bicarbonate, potassium bicarbonate, potassium bicarbonate, potassium, sodium phosphates, potassium, sodium phosphates, potassium, sodium phosphates, senna-docusate 8.6-50 mg, sodium chloride 0.9%    LABS AND DIAGNOSTICS     CBC LAST 3 DAYS  Recent Labs   Lab 05/02/23  2343 05/04/23  0459 05/05/23  0429 05/06/23  0734   WBC 10.07 10.59 8.52 6.22   RBC 5.54 5.10 5.14 5.20   HGB 16.9 15.6 15.6 15.8   HCT 49.8 46.2 47.1 48.1   MCV 90 91 92 93   MCH 30.5 30.6 30.4 30.4   MCHC 33.9 33.8 33.1 32.8   RDW 15.6* 15.7* 16.0* 16.0*    190 180 204   MPV 9.5 9.6 9.6 9.8   GRAN 74.5*  7.5 72.7  7.7 65.4  5.6  --    LYMPH 14.4*  1.5 12.7*  1.4 20.1  1.7  --    MONO 8.4  0.9 12.4  1.3* 12.0   1.0  --    BASO 0.03 0.02 0.02  --    NRBC 0 0 0  --          COAGULATION LAST 3 DAYS  Recent Labs   Lab 05/05/23 0429   INR 1.1         CHEMISTRY LAST 3 DAYS  Recent Labs   Lab 05/02/23  2343 05/04/23  0459 05/05/23  0429 05/06/23  0734   * 139 139 137   K 4.7 4.6 4.8 4.5    105 104 104   CO2 23 26 29 28   ANIONGAP 12 8 6* 5*   BUN 13 13 19 19   CREATININE 1.5* 1.7* 1.7* 1.4   * 98 98 98   CALCIUM 8.9 8.9 8.7 8.7   MG 1.9 1.9 2.1  --    ALBUMIN 3.6 3.2* 3.3* 3.4*   PROT 6.6 6.0 6.2 6.4   ALKPHOS 63 59 53* 51*   ALT 80* 61* 44 57*   AST 43* 29 18 28   BILITOT 0.8 1.3* 0.9 0.9         CARDIAC PROFILE LAST 3 DAYS  Recent Labs   Lab 05/02/23  2343 05/03/23  0818 05/03/23  1346 05/03/23 2005 05/04/23  0956 05/05/23  0429   *  --   --   --   --  531*     --   --   --   --   --    TROPONINI 0.096*   < > 0.218* 0.416* 0.263*  --    TROPONINIHS  --   --   --   --   --  128.0*    < > = values in this interval not displayed.         ENDOCRINE LAST 3 DAYS  Recent Labs   Lab 05/02/23 2343   TSH 0.065*         LAST ARTERIAL BLOOD GAS  ABG  No results for input(s): PH, PO2, PCO2, HCO3, BE in the last 168 hours.    LAST 7 DAYS MICROBIOLOGY   Microbiology Results (last 7 days)       ** No results found for the last 168 hours. **            MOST RECENT IMAGING  Cardiac catheterization    The estimated blood loss was <50 mL.    The pre-procedure left ventricular end diastolic pressure was 24.    Nonobstructive coronaries with mild disease  X-Ray Chest AP Portable  Narrative: EXAMINATION:  XR CHEST AP PORTABLE    CLINICAL HISTORY:  chf;    FINDINGS:  Portable chest at 638 compared with 05/03/2023 shows unchanged slightly enlarged cardiac silhouette size.  Mediastinal contours are normal.  Patient is slightly rotated.    Central pulmonary vascular prominence has not significantly changed.  Perihilar interstitial opacities have improved.  No confluent alveolar consolidation or pleural effusion.  No  acute osseous abnormality.  Impression: Interval improvement of interstitial edema with persistent cardiomegaly and mild central pulmonary vascular prominence.    Electronically signed by: Tor Macias MD  Date:    05/05/2023  Time:    07:08      ECHOCARDIOGRAM RESULTS (last 5)  Results for orders placed during the hospital encounter of 05/02/23    Echo    Interpretation Summary  · The left ventricle is normal in size with mild concentric hypertrophy and  · Mild tricuspid regurgitation.  · Normal right ventricular size with normal right ventricular systolic function.  · Normal central venous pressure (3 mmHg).  · The estimated PA systolic pressure is 50 mmHg.  · There is pulmonary hypertension.  · The estimated ejection fraction is 15%.  · Grade I left ventricular diastolic dysfunction.  · There is severe left ventricular global hypokinesis.      CURRENT/PREVIOUS VISIT EKG  Results for orders placed or performed during the hospital encounter of 05/04/23   EKG 12-lead    Collection Time: 05/04/23 10:24 PM    Narrative    Test Reason : I42.9,    Vent. Rate : 093 BPM     Atrial Rate : 093 BPM     P-R Int : 162 ms          QRS Dur : 102 ms      QT Int : 384 ms       P-R-T Axes : 032 -19 021 degrees     QTc Int : 477 ms    Normal sinus rhythm  Nonspecific T wave abnormality  Prolonged QT  Abnormal ECG  When compared with ECG of 02-MAY-2023 23:57,  Questionable change in The axis  Non-specific change in ST segment in Lateral leads    Referred By: ARIS CARABALLO           Confirmed By:            ASSESSMENT/PLAN:     Active Hospital Problems    Diagnosis    *Cardiomyopathy    KRAIG VS CKD    Troponin I above reference range    Regular alcohol consumption    Acute combined systolic and diastolic congestive heart failure    Abnormal thyroid function test    Gallbladder polyp    Hypertension    Obesity (BMI 30.0-34.9)       ASSESSMENT & PLAN:     New onset HFrEF, severe cardiomyopathy- unclear etiology ischemic vs ETOH vs  tachycardia mediated vs viral vs chronic uncontrolled hypertension.   KRAIG versus CKD- unknown baseline        RECOMMENDATIONS:    Continue amlodipine 10 mg po daily, carvedilol 6.25 mg PO BID, hydralazine 50 mg po TID, Imdur 60 mg po daily.   Start losartan 50 mg po daily.   Recommend low sodium diet and fluid restriction.   Add furosemide 20 mg po daily.   Check BMP and BNP in 1 week. Follow up in our office in 10-14 days.   Patient is uninsured. Will place orders for [employer] and see if he will qualify for any assistance.     Hellen Rinaldi NP  Department of Cardiology  Date of Service: 05/06/2023

## 2023-05-06 NOTE — PLAN OF CARE
"POC reviewed with patient. Patient shares concerns about his kidney fx and new cardiac medicine regimens before discharged. Patient states he was having "sinus pressure and puffiness in hands" post hydralazine and lovenox admin at 22:00 last night. Reassessed shortly, patient states he feels better. Intermit chest tightness during this shift, relieved with hydralazine per pt.. V/S stable, R radial CDI, arm board in place. Bed alarm refused, safety measures addressed.     Problem: Adult Inpatient Plan of Care  Goal: Plan of Care Review  Outcome: Ongoing, Progressing     Problem: Adult Inpatient Plan of Care  Goal: Optimal Comfort and Wellbeing  Outcome: Ongoing, Progressing     Problem: Renal Function Impairment (Acute Kidney Injury/Impairment)  Goal: Effective Renal Function  Outcome: Ongoing, Progressing     Problem: Chest Pain  Goal: Resolution of Chest Pain Symptoms  Outcome: Ongoing, Progressing     Problem: Adjustment to Illness (Heart Failure)  Goal: Optimal Coping  Outcome: Ongoing, Progressing     "

## 2023-05-07 NOTE — DISCHARGE SUMMARY
[facility] Medicine  Discharge Summary      Patient Name: Jordi Moreno  MRN: [id number]  [facility]: [id number]  Patient Class: IP- Inpatient  Admission Date: 5/4/2023  Hospital Length of Stay: 2 days  Discharge Date and Time:  05/07/2023 3:58 PM  Attending Physician: Jackie att. providers found   Discharging Provider: Daniel Riojas MD  Primary Care Provider: Primary Doctor Jackie    Primary Care Team: Networked reference to record PCT     HPI:   Mr. Moreno is a 47-year-old male who presented as a direct admission from [facility] for interventional cardiology evaluation for consideration of cardiac catheterization.  Patient initially presented to outside hospital on 5/2 with 3 day history of abdominal distention and bloating, decreased oral intake, dark urine, constipation.  On presentation noted to have significantly elevated blood pressure (221/135), labs with creatinine 1.5 (unclear baseline), , high sensitivity troponin 0.096 with subsequently peaked at 0.416, TSH low at 0.065 with normal free T4, UDS negative, ethanol negative, CT abdomen and pelvis with concern for pulmonary edema, gallbladder polyp.  He was admitted, initially started on IV Lasix, cardiology consulted.  Subsequent echocardiogram with EF of 15% with grade 1 diastolic dysfunction with PASP 50.  Patient denies any known past medical conditions, states he has not checked his blood pressure in the last year but previously was controlled, no significant past surgical history.  Does admit to regular alcohol consumption.  States his mother has history of congestive heart failure, did require stents, has history of hypertension and diabetes. Blood pressure medications were initiated at outside hospital, seen by Cardiology today, recommends slow IV fluid hydration overnight and plans for RHC versus RHC/LHC tomorrow depending a.m. labs.  Patient does report bilateral chest tightness but denies any chest pain or dyspnea on exertion, does report  nonproductive intermittent cough, states his urine is now clear, reports his GI symptoms has also improved.  Plan of care discussed with patient.  RN in room during my encounter.      Procedure(s) (LRB):  Angiogram, Coronary, with Left Heart Cath (N/A)      Hospital Course:   Josh is a 47-year-old male who presented as a direct admission from [facility] for interventional cardiology evaluation for consideration of cardiac catheterization found new acute onset heart failure.  Mechanism of new onset heart failure is unclear possible viral/alcohol/possible ischemic origin initially .  LHC was done and negative angiogram and cardiomyopathy most likely alcohol related since patient drink a lot daily.  Patient was started on heart failure medications and diuretics and volume status improved and off oxygen and doing well and walking around.  Patient was discharged with low-dose diuretics, ACE inhibitor and patient not able to afford Entresto and Cardiology reviewed in 1-2 weeks       Goals of Care Treatment Preferences:  Code Status: Full Code      Consults:   Consults (From admission, onward)        Status Ordering Provider     Inpatient consult to Cardiology  Once        Provider:  Greg Campos MD    Completed ASHLEY ANSARI     Inpatient consult to Nephrology  Once        Provider:  Edin Patricia MD    Completed ASHLEY ANSARI          No new Assessment & Plan notes have been filed under this hospital service since the last note was generated.  Service: Hospital Medicine    Final Active Diagnoses:    Diagnosis Date Noted POA    PRINCIPAL PROBLEM:  Cardiomyopathy [I42.9] 05/04/2023 Yes    KRAIG VS CKD [N17.9] 05/04/2023 Yes    Troponin I above reference range [R77.8] 05/04/2023 Yes    Regular alcohol consumption [Z78.9] 05/04/2023 Not Applicable    Acute combined systolic and diastolic congestive heart failure [I50.41] 05/04/2023 Yes    Abnormal thyroid function test [R94.6] 05/04/2023 Yes     Gallbladder polyp [K82.4] 05/03/2023 Yes    Hypertension [I10] 05/03/2023 Yes    Obesity (BMI 30.0-34.9) [E66.9] 05/03/2023 Yes      Problems Resolved During this Admission:       Discharged Condition: good    Disposition: Home or Self Care    Follow Up:    Patient Instructions:      Basic metabolic panel   Standing Status: Future Standing Exp. Date: 07/04/24     BNP   Standing Status: Future Standing Exp. Date: 07/04/24     Diet Adult Regular     Activity as tolerated       Significant Diagnostic Studies: Labs:   CMP   Recent Labs   Lab 05/06/23  0734      K 4.5      CO2 28   GLU 98   BUN 19   CREATININE 1.4   CALCIUM 8.7   PROT 6.4   ALBUMIN 3.4*   BILITOT 0.9   ALKPHOS 51*   AST 28   ALT 57*   ANIONGAP 5*    and CBC   Recent Labs   Lab 05/06/23  0734   WBC 6.22   HGB 15.8   HCT 48.1          Pending Diagnostic Studies:     None         Medications:  Reconciled Home Medications:      Medication List      START taking these medications    amLODIPine 10 MG tablet  Commonly known as: NORVASC  Take 1 tablet (10 mg total) by mouth once daily.     carvediloL 6.25 MG tablet  Commonly known as: COREG  Take 1 tablet (6.25 mg total) by mouth 2 (two) times daily.     furosemide 20 MG tablet  Commonly known as: LASIX  Take 1 tablet (20 mg total) by mouth once daily.     hydrALAZINE 50 MG tablet  Commonly known as: APRESOLINE  Take 1 tablet (50 mg total) by mouth every 8 (eight) hours.     isosorbide mononitrate 60 MG 24 hr tablet  Commonly known as: IMDUR  Take 1 tablet (60 mg total) by mouth once daily.     losartan 50 MG tablet  Commonly known as: COZAAR  Take 1 tablet (50 mg total) by mouth once daily.        STOP taking these medications    aspirin 325 MG tablet            Indwelling Lines/Drains at time of discharge:   Lines/Drains/Airways     None               General: Patient resting comfortably in no acute distress. Appears as stated age. Calm  Eyes: EOM intact. No conjunctivae injection. No  scleral icterus.  ENT: Hearing grossly intact. No discharge from ears. No nasal discharge.   CVS: RRR. No LE edema BL.  Lungs: CTA BL, no wheezing or crackles. Good breath sounds. No accessory muscle use. No acute respiratory distress  Neuro: non focal , Follows commands. Responds appropriately  Time spent on the discharge of patient: 44 minutes         Daniel Riojas MD  Department of Hospital Medicine  [facility]

## 2023-05-08 ENCOUNTER — PATIENT MESSAGE (OUTPATIENT)
Dept: CARDIOLOGY | Facility: CLINIC | Age: 48
End: 2023-05-08
Payer: MEDICAID

## 2023-05-08 LAB
PATHOLOGIST INTERPRETATION UPE: NORMAL
PROT PATTERN UR ELPH-IMP: NORMAL

## 2023-08-07 PROBLEM — N17.9 AKI (ACUTE KIDNEY INJURY): Status: RESOLVED | Noted: 2023-05-04 | Resolved: 2023-08-07

## 2024-04-19 ENCOUNTER — PATIENT MESSAGE (OUTPATIENT)
Dept: CARDIOLOGY | Facility: CLINIC | Age: 49
End: 2024-04-19
Payer: MEDICAID

## 2024-04-19 DIAGNOSIS — I10 HYPERTENSION, UNSPECIFIED TYPE: ICD-10-CM

## 2024-04-29 NOTE — ADDENDUM NOTE
Addended by: JASON SWEET on: 4/29/2024 10:28 AM     Modules accepted: Orders     Impression: Presbyopia: H52.4. Plan: recommend glasses for optimal vision.

## 2024-05-08 RX ORDER — FUROSEMIDE 20 MG/1
20 TABLET ORAL DAILY
Qty: 90 TABLET | Refills: 0 | Status: SHIPPED | OUTPATIENT
Start: 2024-05-08 | End: 2024-08-06

## 2024-05-08 RX ORDER — AMLODIPINE BESYLATE 10 MG/1
10 TABLET ORAL DAILY
Qty: 90 TABLET | Refills: 0 | Status: SHIPPED | OUTPATIENT
Start: 2024-05-08 | End: 2024-08-06

## (undated) DEVICE — TR BAND

## (undated) DEVICE — SHEATH INTRODUCER SLENDER 6FX10CM

## (undated) DEVICE — CATHETER DIAGNOSTIC DXTERITY 5FR JL3.5

## (undated) DEVICE — CATHETER DIAGNOSTIC DXTERITY 5FR JR4.0

## (undated) DEVICE — GUIDEWIRE J TIP EXCHANGE FIXED CORE 260